# Patient Record
Sex: FEMALE | Race: WHITE | NOT HISPANIC OR LATINO | Employment: FULL TIME | RURAL
[De-identification: names, ages, dates, MRNs, and addresses within clinical notes are randomized per-mention and may not be internally consistent; named-entity substitution may affect disease eponyms.]

---

## 2020-06-29 ENCOUNTER — HISTORICAL (OUTPATIENT)
Dept: ADMINISTRATIVE | Facility: HOSPITAL | Age: 65
End: 2020-06-29

## 2020-06-29 LAB
25(OH)D3 SERPL-MCNC: 26.6 NG/ML
BASOPHILS # BLD AUTO: 0.05 X10E3/UL (ref 0–0.2)
BASOPHILS NFR BLD AUTO: 0.9 % (ref 0–1)
EOSINOPHIL # BLD AUTO: 0.21 X10E3/UL (ref 0–0.5)
EOSINOPHIL NFR BLD AUTO: 3.7 % (ref 1–4)
ERYTHROCYTE [DISTWIDTH] IN BLOOD BY AUTOMATED COUNT: 13.6 % (ref 11.5–14.5)
HCT VFR BLD AUTO: 45 % (ref 38–47)
HGB BLD-MCNC: 13.6 G/DL (ref 12–16)
IMM GRANULOCYTES # BLD AUTO: 0.01 X10E3/UL (ref 0–0.04)
IMM GRANULOCYTES NFR BLD: 0.2 % (ref 0–0.4)
LYMPHOCYTES # BLD AUTO: 1.76 X10E3/UL (ref 1–4.8)
LYMPHOCYTES NFR BLD AUTO: 30.6 % (ref 27–41)
MCH RBC QN AUTO: 28.4 PG (ref 27–31)
MCHC RBC AUTO-ENTMCNC: 30.2 G/DL (ref 32–36)
MCV RBC AUTO: 93.9 FL (ref 80–96)
MONOCYTES # BLD AUTO: 0.44 X10E3/UL (ref 0–0.8)
MONOCYTES NFR BLD AUTO: 7.7 % (ref 2–6)
MPC BLD CALC-MCNC: 11.3 FL (ref 9.4–12.4)
NEUTROPHILS # BLD AUTO: 3.28 X10E3/UL (ref 1.8–7.7)
NEUTROPHILS NFR BLD AUTO: 56.9 % (ref 53–65)
NRBC # BLD AUTO: 0 X10E3/UL (ref 0–0)
NRBC, AUTO (.00): 0 /100 (ref 0–0)
PLATELET # BLD AUTO: 346 X10E3/UL (ref 150–400)
RBC # BLD AUTO: 4.79 X10E6/UL (ref 4.2–5.4)
TSH SERPL DL<=0.005 MIU/L-ACNC: 3.54 UIU/ML (ref 0.36–3.74)
WBC # BLD AUTO: 5.75 X10E3/UL (ref 4.5–11)

## 2020-06-30 ENCOUNTER — HISTORICAL (OUTPATIENT)
Dept: ADMINISTRATIVE | Facility: HOSPITAL | Age: 65
End: 2020-06-30

## 2020-06-30 LAB
CHOLEST SERPL-MCNC: 260 MG/DL
CHOLEST/HDLC SERPL: 4.1 {RATIO}
EST. AVERAGE GLUCOSE BLD GHB EST-MCNC: 110 MG/DL
HBA1C MFR BLD HPLC: 5.9 %
HDLC SERPL-MCNC: 63 MG/DL
LDLC SERPL CALC-MCNC: 165 MG/DL
TRIGL SERPL-MCNC: 160 MG/DL

## 2020-07-02 LAB — TTG IGA SER IA-ACNC: <1.2 U/ML

## 2020-08-24 ENCOUNTER — HISTORICAL (OUTPATIENT)
Dept: ADMINISTRATIVE | Facility: HOSPITAL | Age: 65
End: 2020-08-24

## 2020-08-24 LAB
25(OH)D3 SERPL-MCNC: 48 NG/ML
ALBUMIN SERPL BCP-MCNC: 4.3 G/DL (ref 3.5–5)
ALBUMIN/GLOB SERPL: 1.1 {RATIO}
ALP SERPL-CCNC: 66 U/L (ref 50–130)
ALT SERPL W P-5'-P-CCNC: 18 U/L (ref 13–56)
ANION GAP SERPL CALCULATED.3IONS-SCNC: 11.3 MMOL/L (ref 7–16)
AST SERPL W P-5'-P-CCNC: 17 U/L (ref 15–37)
BILIRUB SERPL-MCNC: 0.3 MG/DL (ref 0–1.2)
BUN SERPL-MCNC: 15 MG/DL (ref 7–18)
BUN/CREAT SERPL: 16
CALCIUM SERPL-MCNC: 9.8 MG/DL (ref 8.5–10.1)
CHLORIDE SERPL-SCNC: 108 MMOL/L (ref 98–107)
CHOLEST SERPL-MCNC: 243 MG/DL
CO2 SERPL-SCNC: 28 MMOL/L (ref 21–32)
CREAT SERPL-MCNC: 0.95 MG/DL (ref 0.5–1.02)
GLOBULIN SER-MCNC: 3.8 G/DL (ref 2–4)
GLUCOSE SERPL-MCNC: 123 MG/DL (ref 74–106)
HDLC SERPL-MCNC: 73 MG/DL
LDLC SERPL CALC-MCNC: 131 MG/DL
NONHDLC SERPL-MCNC: 170 MG/DL
POTASSIUM SERPL-SCNC: 4.3 MMOL/L (ref 3.5–5.1)
PROT SERPL-MCNC: 8.1 G/DL (ref 6.4–8.2)
SODIUM SERPL-SCNC: 143 MMOL/L (ref 136–145)
TRIGL SERPL-MCNC: 196 MG/DL
VLDLC SERPL-MCNC: 39 MG/DL

## 2020-11-20 ENCOUNTER — HISTORICAL (OUTPATIENT)
Dept: ADMINISTRATIVE | Facility: HOSPITAL | Age: 65
End: 2020-11-20

## 2020-11-20 LAB
25(OH)D3 SERPL-MCNC: 32.3 NG/ML
ALBUMIN SERPL BCP-MCNC: 3.9 G/DL (ref 3.5–5)
ALBUMIN/GLOB SERPL: 1 {RATIO}
ALP SERPL-CCNC: 65 U/L (ref 50–130)
ALT SERPL W P-5'-P-CCNC: 16 U/L (ref 13–56)
ANION GAP SERPL CALCULATED.3IONS-SCNC: 8 MMOL/L (ref 7–16)
AST SERPL W P-5'-P-CCNC: 19 U/L (ref 15–37)
BILIRUB SERPL-MCNC: 0.3 MG/DL (ref 0–1.2)
BUN SERPL-MCNC: 16 MG/DL (ref 7–18)
BUN/CREAT SERPL: 16
CALCIUM SERPL-MCNC: 9.1 MG/DL (ref 8.5–10.1)
CHLORIDE SERPL-SCNC: 109 MMOL/L (ref 98–107)
CHOLEST SERPL-MCNC: 246 MG/DL
CHOLEST/HDLC SERPL: 3.6 {RATIO}
CO2 SERPL-SCNC: 29 MMOL/L (ref 21–32)
CREAT SERPL-MCNC: 1 MG/DL (ref 0.5–1.02)
EST. AVERAGE GLUCOSE BLD GHB EST-MCNC: 107 MG/DL
GLOBULIN SER-MCNC: 3.9 G/DL (ref 2–4)
GLUCOSE SERPL-MCNC: 169 MG/DL (ref 74–106)
HBA1C MFR BLD HPLC: 5.8 %
HDLC SERPL-MCNC: 69 MG/DL
LDLC SERPL CALC-MCNC: 129 MG/DL
POTASSIUM SERPL-SCNC: 3.8 MMOL/L (ref 3.5–5.1)
PROT SERPL-MCNC: 7.8 G/DL (ref 6.4–8.2)
SODIUM SERPL-SCNC: 142 MMOL/L (ref 136–145)
TRIGL SERPL-MCNC: 239 MG/DL
TSH SERPL DL<=0.005 MIU/L-ACNC: 1.98 UIU/ML (ref 0.36–3.74)

## 2021-07-01 DIAGNOSIS — M54.9 DORSALGIA, UNSPECIFIED: ICD-10-CM

## 2023-05-25 ENCOUNTER — HOSPITAL ENCOUNTER (OUTPATIENT)
Dept: RADIOLOGY | Facility: HOSPITAL | Age: 68
Discharge: HOME | End: 2023-05-25
Attending: NURSE PRACTITIONER
Payer: COMMERCIAL

## 2023-05-25 DIAGNOSIS — E07.9 DISEASE OF THYROID GLAND: ICD-10-CM

## 2023-05-25 PROCEDURE — 76536 US THYROID: ICD-10-PCS | Mod: 26,,, | Performed by: RADIOLOGY

## 2023-05-25 PROCEDURE — 76536 US EXAM OF HEAD AND NECK: CPT | Mod: TC

## 2023-05-25 PROCEDURE — 76536 US EXAM OF HEAD AND NECK: CPT | Mod: 26,,, | Performed by: RADIOLOGY

## 2023-05-31 ENCOUNTER — OFFICE VISIT (OUTPATIENT)
Dept: FAMILY MEDICINE | Facility: CLINIC | Age: 68
End: 2023-05-31
Payer: COMMERCIAL

## 2023-05-31 VITALS
TEMPERATURE: 98 F | HEART RATE: 66 BPM | OXYGEN SATURATION: 97 % | SYSTOLIC BLOOD PRESSURE: 124 MMHG | BODY MASS INDEX: 35.26 KG/M2 | RESPIRATION RATE: 18 BRPM | DIASTOLIC BLOOD PRESSURE: 82 MMHG | WEIGHT: 168 LBS | HEIGHT: 58 IN

## 2023-05-31 DIAGNOSIS — E04.2 MULTIPLE THYROID NODULES: Primary | ICD-10-CM

## 2023-05-31 PROCEDURE — 1159F PR MEDICATION LIST DOCUMENTED IN MEDICAL RECORD: ICD-10-PCS | Mod: ,,, | Performed by: FAMILY MEDICINE

## 2023-05-31 PROCEDURE — 99202 PR OFFICE/OUTPT VISIT, NEW, LEVL II, 15-29 MIN: ICD-10-PCS | Mod: ,,, | Performed by: FAMILY MEDICINE

## 2023-05-31 PROCEDURE — 3008F PR BODY MASS INDEX (BMI) DOCUMENTED: ICD-10-PCS | Mod: ,,, | Performed by: FAMILY MEDICINE

## 2023-05-31 PROCEDURE — 99202 OFFICE O/P NEW SF 15 MIN: CPT | Mod: ,,, | Performed by: FAMILY MEDICINE

## 2023-05-31 PROCEDURE — 3288F PR FALLS RISK ASSESSMENT DOCUMENTED: ICD-10-PCS | Mod: ,,, | Performed by: FAMILY MEDICINE

## 2023-05-31 PROCEDURE — 1159F MED LIST DOCD IN RCRD: CPT | Mod: ,,, | Performed by: FAMILY MEDICINE

## 2023-05-31 PROCEDURE — 3074F PR MOST RECENT SYSTOLIC BLOOD PRESSURE < 130 MM HG: ICD-10-PCS | Mod: ,,, | Performed by: FAMILY MEDICINE

## 2023-05-31 PROCEDURE — 3288F FALL RISK ASSESSMENT DOCD: CPT | Mod: ,,, | Performed by: FAMILY MEDICINE

## 2023-05-31 PROCEDURE — 1160F RVW MEDS BY RX/DR IN RCRD: CPT | Mod: ,,, | Performed by: FAMILY MEDICINE

## 2023-05-31 PROCEDURE — 1101F PR PT FALLS ASSESS DOC 0-1 FALLS W/OUT INJ PAST YR: ICD-10-PCS | Mod: ,,, | Performed by: FAMILY MEDICINE

## 2023-05-31 PROCEDURE — 3008F BODY MASS INDEX DOCD: CPT | Mod: ,,, | Performed by: FAMILY MEDICINE

## 2023-05-31 PROCEDURE — 3079F PR MOST RECENT DIASTOLIC BLOOD PRESSURE 80-89 MM HG: ICD-10-PCS | Mod: ,,, | Performed by: FAMILY MEDICINE

## 2023-05-31 PROCEDURE — 3074F SYST BP LT 130 MM HG: CPT | Mod: ,,, | Performed by: FAMILY MEDICINE

## 2023-05-31 PROCEDURE — 1160F PR REVIEW ALL MEDS BY PRESCRIBER/CLIN PHARMACIST DOCUMENTED: ICD-10-PCS | Mod: ,,, | Performed by: FAMILY MEDICINE

## 2023-05-31 PROCEDURE — 1101F PT FALLS ASSESS-DOCD LE1/YR: CPT | Mod: ,,, | Performed by: FAMILY MEDICINE

## 2023-05-31 PROCEDURE — 3079F DIAST BP 80-89 MM HG: CPT | Mod: ,,, | Performed by: FAMILY MEDICINE

## 2023-05-31 RX ORDER — SERTRALINE HYDROCHLORIDE 100 MG/1
100 TABLET, FILM COATED ORAL DAILY
COMMUNITY

## 2023-05-31 RX ORDER — DILTIAZEM HYDROCHLORIDE 120 MG/1
180 CAPSULE, COATED, EXTENDED RELEASE ORAL DAILY
COMMUNITY
Start: 2023-04-11

## 2023-05-31 RX ORDER — LEVOTHYROXINE SODIUM 25 UG/1
25 TABLET ORAL
COMMUNITY

## 2023-05-31 RX ORDER — TRAZODONE HYDROCHLORIDE 150 MG/1
150 TABLET ORAL NIGHTLY PRN
COMMUNITY
Start: 2023-05-08

## 2023-05-31 RX ORDER — PRAZOSIN HYDROCHLORIDE 1 MG/1
1 CAPSULE ORAL NIGHTLY
COMMUNITY

## 2023-05-31 RX ORDER — ALPRAZOLAM 0.5 MG/1
0.5 TABLET ORAL
COMMUNITY
Start: 2023-05-08

## 2023-06-02 NOTE — PROGRESS NOTES
Sheila Ortiz is a 67 y.o. female seen today for follow-up on her thyroid.  Patient does have a history of multiple thyroid nodules and we discussed general surgery evaluation.    Past Medical History:   Diagnosis Date    Diabetes mellitus, type 2     Hypothyroidism      No family history on file.  Current Outpatient Medications on File Prior to Visit   Medication Sig Dispense Refill    ALPRAZolam (XANAX) 0.5 MG tablet Take 0.5 mg by mouth as needed.      diltiaZEM (CARDIZEM CD) 120 MG Cp24 Take 120 mg by mouth once daily. At night      levothyroxine (SYNTHROID) 25 MCG tablet Take 25 mcg by mouth before breakfast.      prazosin (MINIPRESS) 1 MG Cap Take 1 mg by mouth every evening.      sertraline (ZOLOFT) 100 MG tablet Take 100 mg by mouth once daily.      traZODone (DESYREL) 150 MG tablet Take 150 mg by mouth nightly as needed.       No current facility-administered medications on file prior to visit.       There is no immunization history on file for this patient.    Review of Systems   Constitutional:  Positive for malaise/fatigue. Negative for fever and weight loss.   Respiratory:  Negative for shortness of breath.    Cardiovascular:  Negative for chest pain and palpitations.   Gastrointestinal:  Negative for nausea and vomiting.   Musculoskeletal:  Positive for joint pain and myalgias.   Psychiatric/Behavioral:  Negative for depression.       Vitals:    05/31/23 0850   BP: 124/82   Pulse: 66   Resp: 18   Temp: 98.1 °F (36.7 °C)       Physical Exam  Vitals reviewed.   Constitutional:       Appearance: Normal appearance.   HENT:      Head: Normocephalic.   Eyes:      Extraocular Movements: Extraocular movements intact.      Conjunctiva/sclera: Conjunctivae normal.      Pupils: Pupils are equal, round, and reactive to light.   Neck:      Thyroid: Thyromegaly present. No thyroid mass.   Cardiovascular:      Rate and Rhythm: Normal rate and regular rhythm.      Heart sounds: Normal heart sounds. No murmur heard.    No  gallop.   Pulmonary:      Effort: Pulmonary effort is normal. No respiratory distress.      Breath sounds: Normal breath sounds. No wheezing or rales.   Skin:     General: Skin is warm and dry.      Coloration: Skin is not jaundiced or pale.   Neurological:      Mental Status: She is alert.   Psychiatric:         Mood and Affect: Mood normal.         Behavior: Behavior normal.         Thought Content: Thought content normal.         Judgment: Judgment normal.        Assessment and Plan  Multiple thyroid nodules  -     Ambulatory referral/consult to General Surgery; Future; Expected date: 06/07/2023            Return to clinic in 1 month or as needed after her general surgery evaluation.    Health Maintenance Topics with due status: Not Due       Topic Last Completion Date    Lipid Panel 08/24/2020    Hemoglobin A1c (Diabetic Prevention Screening) 11/20/2020    Influenza Vaccine Not Due

## 2023-08-01 DIAGNOSIS — R41.3 SHORT-TERM MEMORY LOSS: Primary | ICD-10-CM

## 2023-09-14 ENCOUNTER — OFFICE VISIT (OUTPATIENT)
Dept: NEUROLOGY | Facility: CLINIC | Age: 68
End: 2023-09-14
Payer: COMMERCIAL

## 2023-09-14 VITALS
DIASTOLIC BLOOD PRESSURE: 80 MMHG | BODY MASS INDEX: 36.11 KG/M2 | WEIGHT: 172 LBS | SYSTOLIC BLOOD PRESSURE: 112 MMHG | HEART RATE: 69 BPM | HEIGHT: 58 IN | OXYGEN SATURATION: 97 %

## 2023-09-14 DIAGNOSIS — E55.9 VITAMIN D DEFICIENCY: ICD-10-CM

## 2023-09-14 DIAGNOSIS — E03.9 HYPOTHYROIDISM, UNSPECIFIED TYPE: ICD-10-CM

## 2023-09-14 DIAGNOSIS — R41.3 MEMORY IMPAIRMENT: Primary | ICD-10-CM

## 2023-09-14 DIAGNOSIS — E53.8 VITAMIN B12 DEFICIENCY: ICD-10-CM

## 2023-09-14 LAB
BILIRUB UR QL STRIP: NEGATIVE
CLARITY UR: CLEAR
COLOR UR: ABNORMAL
GLUCOSE UR STRIP-MCNC: NORMAL MG/DL
KETONES UR STRIP-SCNC: NEGATIVE MG/DL
LEUKOCYTE ESTERASE UR QL STRIP: ABNORMAL
MUCOUS, UA: ABNORMAL /LPF
NITRITE UR QL STRIP: NEGATIVE
PH UR STRIP: 5 PH UNITS
PROT UR QL STRIP: NEGATIVE
RBC # UR STRIP: ABNORMAL /UL
RBC #/AREA URNS HPF: 3 /HPF
SP GR UR STRIP: 1.02
SQUAMOUS #/AREA URNS LPF: ABNORMAL /HPF
UROBILINOGEN UR STRIP-ACNC: NORMAL MG/DL
WBC #/AREA URNS HPF: 10 /HPF

## 2023-09-14 PROCEDURE — 99203 PR OFFICE/OUTPT VISIT, NEW, LEVL III, 30-44 MIN: ICD-10-PCS | Mod: S$GLB,,, | Performed by: NURSE PRACTITIONER

## 2023-09-14 PROCEDURE — 1101F PR PT FALLS ASSESS DOC 0-1 FALLS W/OUT INJ PAST YR: ICD-10-PCS | Mod: S$GLB,,, | Performed by: NURSE PRACTITIONER

## 2023-09-14 PROCEDURE — 81001 URINALYSIS AUTO W/SCOPE: CPT | Mod: ,,, | Performed by: CLINICAL MEDICAL LABORATORY

## 2023-09-14 PROCEDURE — 99203 OFFICE O/P NEW LOW 30 MIN: CPT | Mod: S$GLB,,, | Performed by: NURSE PRACTITIONER

## 2023-09-14 PROCEDURE — 3079F DIAST BP 80-89 MM HG: CPT | Mod: S$GLB,,, | Performed by: NURSE PRACTITIONER

## 2023-09-14 PROCEDURE — 1160F PR REVIEW ALL MEDS BY PRESCRIBER/CLIN PHARMACIST DOCUMENTED: ICD-10-PCS | Mod: S$GLB,,, | Performed by: NURSE PRACTITIONER

## 2023-09-14 PROCEDURE — 99215 OFFICE O/P EST HI 40 MIN: CPT | Mod: PBBFAC | Performed by: NURSE PRACTITIONER

## 2023-09-14 PROCEDURE — 3008F BODY MASS INDEX DOCD: CPT | Mod: S$GLB,,, | Performed by: NURSE PRACTITIONER

## 2023-09-14 PROCEDURE — 1101F PT FALLS ASSESS-DOCD LE1/YR: CPT | Mod: S$GLB,,, | Performed by: NURSE PRACTITIONER

## 2023-09-14 PROCEDURE — 3008F PR BODY MASS INDEX (BMI) DOCUMENTED: ICD-10-PCS | Mod: S$GLB,,, | Performed by: NURSE PRACTITIONER

## 2023-09-14 PROCEDURE — 1160F RVW MEDS BY RX/DR IN RCRD: CPT | Mod: S$GLB,,, | Performed by: NURSE PRACTITIONER

## 2023-09-14 PROCEDURE — 3044F PR MOST RECENT HEMOGLOBIN A1C LEVEL <7.0%: ICD-10-PCS | Mod: S$GLB,,, | Performed by: NURSE PRACTITIONER

## 2023-09-14 PROCEDURE — 3044F HG A1C LEVEL LT 7.0%: CPT | Mod: S$GLB,,, | Performed by: NURSE PRACTITIONER

## 2023-09-14 PROCEDURE — 3288F FALL RISK ASSESSMENT DOCD: CPT | Mod: S$GLB,,, | Performed by: NURSE PRACTITIONER

## 2023-09-14 PROCEDURE — 3079F PR MOST RECENT DIASTOLIC BLOOD PRESSURE 80-89 MM HG: ICD-10-PCS | Mod: S$GLB,,, | Performed by: NURSE PRACTITIONER

## 2023-09-14 PROCEDURE — 3074F PR MOST RECENT SYSTOLIC BLOOD PRESSURE < 130 MM HG: ICD-10-PCS | Mod: S$GLB,,, | Performed by: NURSE PRACTITIONER

## 2023-09-14 PROCEDURE — 3074F SYST BP LT 130 MM HG: CPT | Mod: S$GLB,,, | Performed by: NURSE PRACTITIONER

## 2023-09-14 PROCEDURE — 81001 URINALYSIS, REFLEX TO URINE CULTURE: ICD-10-PCS | Mod: ,,, | Performed by: CLINICAL MEDICAL LABORATORY

## 2023-09-14 PROCEDURE — 1126F AMNT PAIN NOTED NONE PRSNT: CPT | Mod: S$GLB,,, | Performed by: NURSE PRACTITIONER

## 2023-09-14 PROCEDURE — 1159F MED LIST DOCD IN RCRD: CPT | Mod: S$GLB,,, | Performed by: NURSE PRACTITIONER

## 2023-09-14 PROCEDURE — 1126F PR PAIN SEVERITY QUANTIFIED, NO PAIN PRESENT: ICD-10-PCS | Mod: S$GLB,,, | Performed by: NURSE PRACTITIONER

## 2023-09-14 PROCEDURE — 1159F PR MEDICATION LIST DOCUMENTED IN MEDICAL RECORD: ICD-10-PCS | Mod: S$GLB,,, | Performed by: NURSE PRACTITIONER

## 2023-09-14 PROCEDURE — 3288F PR FALLS RISK ASSESSMENT DOCUMENTED: ICD-10-PCS | Mod: S$GLB,,, | Performed by: NURSE PRACTITIONER

## 2023-09-14 RX ORDER — FUROSEMIDE 20 MG/1
20 TABLET ORAL DAILY PRN
COMMUNITY
Start: 2023-07-06

## 2023-09-14 RX ORDER — ROSUVASTATIN CALCIUM 10 MG/1
10 TABLET, COATED ORAL
COMMUNITY
Start: 2023-07-10

## 2023-09-14 NOTE — PATIENT INSTRUCTIONS
MRI brain  Dementia panel labs  Do not recommend medications at this time pending further evaluation  Good eating and sleep habits encouraged  Schedule neuropsychological testing with Dr. Hendrix at [facility]

## 2023-09-14 NOTE — PROGRESS NOTES
Subjective:       Patient ID: Sheila Ortiz is a 68 y.o. female     Chief Complaint:  No chief complaint on file.       Allergies:  Cephalexin, Clarithromycin, Codeine phosphate, Codeine sulfate, Penicillins, Sulfa (sulfonamide antibiotics), Sulfamethoxazole-trimethoprim, and Triple antibiotic (colistimth)    Current Medications:    Outpatient Encounter Medications as of 9/14/2023   Medication Sig Dispense Refill    ALPRAZolam (XANAX) 0.5 MG tablet Take 0.5 mg by mouth as needed.      diltiaZEM (CARDIZEM CD) 120 MG Cp24 Take 180 mg by mouth once daily. At night      furosemide (LASIX) 20 MG tablet Take 20 mg by mouth daily as needed.      levothyroxine (SYNTHROID) 25 MCG tablet Take 25 mcg by mouth before breakfast.      prazosin (MINIPRESS) 1 MG Cap Take 1 mg by mouth every evening.      rosuvastatin (CRESTOR) 10 MG tablet Take 10 mg by mouth.      sertraline (ZOLOFT) 100 MG tablet Take 100 mg by mouth once daily.      traZODone (DESYREL) 150 MG tablet Take 150 mg by mouth nightly as needed.       No facility-administered encounter medications on file as of 9/14/2023.       History of Present Illness  67 y/o female new referral to neurology for reported memory impairment    Mother had dementia, thus patient has concern about this in her family history    Patient does have history of anxiety and depression prescribed xanax to take PRN Bid as well as trazodone and zoloft.  Trazodone for insomnia.  She is college educated, actually teaches online psychiatry.  She is having more difficulty focusing on research which is quite upsetting to her.    She denies sleep apnea, had prior sleep study in 2012 and negative.  Denies classic sleep apnea symptoms.    She has complaints of losing train of thought easily, word searching.  She denies complications with ADLs, denies getting lost driving to familiar locations.           Review of Systems  Review of Systems   Constitutional:  Negative for diaphoresis and fever.   HENT:   Negative for congestion, hearing loss and tinnitus.    Eyes:  Negative for blurred vision, double vision, photophobia, discharge and redness.   Respiratory:  Negative for cough and shortness of breath.    Cardiovascular:  Negative for chest pain.   Gastrointestinal:  Negative for abdominal pain, nausea and vomiting.   Musculoskeletal:  Negative for back pain, joint pain, myalgias and neck pain.   Skin:  Negative for itching and rash.   Neurological:  Positive for dizziness and headaches. Negative for tremors, sensory change, speech change, focal weakness, seizures, loss of consciousness and weakness.   Psychiatric/Behavioral:  Positive for depression and memory loss. Negative for hallucinations. The patient is nervous/anxious. The patient does not have insomnia.    All other systems reviewed and are negative.     Objective:     NEUROLOGICAL EXAMINATION:     MENTAL STATUS   Oriented to person, place, and time.   Registration: recalls 3 of 3 objects. Recall at 5 minutes: recalls 3 of 3 objects.   Attention: normal. Concentration: normal.   Speech: speech is normal   Level of consciousness: alert  Knowledge: good and consistent with education.   Normal comprehension.     CRANIAL NERVES     CN II   Visual fields full to confrontation.   Visual acuity: normal  Right visual field deficit: none  Left visual field deficit: none     CN III, IV, VI   Pupils are equal, round, and reactive to light.  Extraocular motions are normal.   Right pupil: Size: 3 mm. Shape: regular. Reactivity: brisk. Consensual response: intact. Accommodation: intact.   Left pupil: Size: 3 mm. Shape: regular. Reactivity: brisk. Consensual response: intact. Accommodation: intact.   CN III: no CN III palsy  CN VI: no CN VI palsy  Nystagmus: none   Diplopia: none  Upgaze: normal  Downgaze: normal  Conjugate gaze: present  Vestibulo-ocular reflex: present    CN V   Facial sensation intact.   Right facial sensation deficit: none  Left facial sensation  deficit: none  Right corneal reflex: normal  Left corneal reflex: normal    CN VII   Facial expression full, symmetric.   Right facial weakness: none  Left facial weakness: none  Right taste: normal  Left taste: normal    CN VIII   CN VIII normal.   Hearing: intact    CN IX, X   CN IX normal.   CN X normal.   Palate: symmetric    CN XI   CN XI normal.   Right sternocleidomastoid strength: normal  Left sternocleidomastoid strength: normal  Right trapezius strength: normal  Left trapezius strength: normal    CN XII   CN XII normal.   Tongue: not atrophic  Fasciculations: absent  Tongue deviation: none    MOTOR EXAM   Muscle bulk: normal  Overall muscle tone: normal  Right arm tone: normal  Left arm tone: normal  Right arm pronator drift: absent  Left arm pronator drift: absent  Right leg tone: normal  Left leg tone: normal    Strength   Right neck flexion: 5/5  Left neck flexion: 5/5  Right neck extension: 5/5  Left neck extension: 5/5  Right deltoid: 5/5  Left deltoid: 5/5  Right biceps: 5/5  Left biceps: 5/5  Right triceps: 5/5  Left triceps: 5/5  Right wrist flexion: 5/5  Left wrist flexion: 5/5  Right wrist extension: 5/5  Left wrist extension: 5/5  Right interossei: 5/5  Left interossei: 5/5  Right iliopsoas: 5/5  Left iliopsoas: 5/5  Right quadriceps: 5/5  Left quadriceps: 5/5  Right hamstrin/5  Left hamstrin/5  Right anterior tibial: 5/5  Left anterior tibial: 5/5  Right posterior tibial: 5/5  Left posterior tibial: 5/5  Right gastroc: 5/5  Left gastroc: 5/5    REFLEXES     Reflexes   Right brachioradialis: 2+  Left brachioradialis: 2+  Right biceps: 2+  Left biceps: 2+  Right triceps: 2+  Left triceps: 2+  Right patellar: 2+  Left patellar: 2+  Right achilles: 2+  Left achilles: 2+  Right plantar: normal  Left plantar: normal  Right Spears: absent  Left Spears: absent  Right ankle clonus: absent  Left ankle clonus: absent  Right pendular knee jerk: absent  Left pendular knee jerk: absent    SENSORY  EXAM   Light touch normal.   Right arm light touch: normal  Left arm light touch: normal  Right leg light touch: normal  Left leg light touch: normal  Vibration normal.   Right arm vibration: normal  Left arm vibration: normal  Right leg vibration: normal  Left leg vibration: normal  Proprioception normal.   Right arm proprioception: normal  Left arm proprioception: normal  Right leg proprioception: normal  Left leg proprioception: normal  Pinprick normal.   Right arm pinprick: normal  Left arm pinprick: normal  Right leg pinprick: normal  Left leg pinprick: normal  Graphesthesia: normal  Romberg: negative  Stereognosis: normal    GAIT AND COORDINATION     Gait  Gait: normal     Coordination   Finger to nose coordination: normal  Heel to shin coordination: normal  Tandem walking coordination: normal    Tremor   Resting tremor: absent  Intention tremor: absent  Action tremor: absent       Physical Exam  Vitals and nursing note reviewed.   Constitutional:       Appearance: Normal appearance.   HENT:      Head: Normocephalic.   Eyes:      Extraocular Movements: Extraocular movements intact and EOM normal.      Pupils: Pupils are equal, round, and reactive to light.   Cardiovascular:      Rate and Rhythm: Normal rate and regular rhythm.   Pulmonary:      Effort: Pulmonary effort is normal.      Breath sounds: Normal breath sounds.   Musculoskeletal:         General: No swelling or tenderness. Normal range of motion.      Cervical back: Normal range of motion and neck supple.      Right lower leg: No edema.      Left lower leg: No edema.   Skin:     General: Skin is warm and dry.      Coloration: Skin is not jaundiced.      Findings: No rash.   Neurological:      General: No focal deficit present.      Mental Status: She is alert and oriented to person, place, and time.      GCS: GCS eye subscore is 4. GCS verbal subscore is 5. GCS motor subscore is 6.      Cranial Nerves: No cranial nerve deficit.      Sensory: No  sensory deficit.      Motor: Motor function is intact. No weakness.      Coordination: Coordination is intact. Coordination normal. Finger-Nose-Finger Test, Heel to Shin Test and Romberg Test normal.      Gait: Gait is intact. Gait and tandem walk normal.      Deep Tendon Reflexes: Reflexes normal.      Reflex Scores:       Tricep reflexes are 2+ on the right side and 2+ on the left side.       Bicep reflexes are 2+ on the right side and 2+ on the left side.       Brachioradialis reflexes are 2+ on the right side and 2+ on the left side.       Patellar reflexes are 2+ on the right side and 2+ on the left side.       Achilles reflexes are 2+ on the right side and 2+ on the left side.  Psychiatric:         Mood and Affect: Mood normal.         Speech: Speech normal.         Behavior: Behavior normal.          Assessment:     Problem List Items Addressed This Visit    None  Visit Diagnoses       Memory impairment    -  Primary    Relevant Orders    Ammonia    CBC Auto Differential    Comprehensive Metabolic Panel    Urinalysis, Reflex to Urine Culture    Ambulatory referral/consult to Adult Neuropsychology    Vitamin B12 deficiency        Relevant Orders    Folate    Vitamin B12    Vitamin D deficiency        Relevant Orders    Vitamin D    Hypothyroidism, unspecified type        Relevant Orders    TSH             Primary Diagnosis and ICD10  Memory impairment [R41.3]    Plan:     Patient Instructions   MRI brain  Dementia panel labs  Do not recommend medications at this time pending further evaluation  Good eating and sleep habits encouraged  Schedule neuropsychological testing with Dr. Hendrix at [facility]    There are no discontinued medications.    Requested Prescriptions      No prescriptions requested or ordered in this encounter       Orders Placed This Encounter   Procedures    Ammonia    CBC Auto Differential    Comprehensive Metabolic Panel    Folate    Vitamin D    Vitamin B12    TSH    Urinalysis,  Reflex to Urine Culture    Ambulatory referral/consult to Adult Neuropsychology

## 2023-10-13 ENCOUNTER — TELEPHONE (OUTPATIENT)
Dept: NEUROLOGY | Facility: CLINIC | Age: 68
End: 2023-10-13
Payer: COMMERCIAL

## 2023-10-13 NOTE — TELEPHONE ENCOUNTER
Called Patient twice  VM Full.      ----- Message from KENISHA Malik sent at 9/15/2023  6:25 AM CDT -----  Her labs are not bad at all, her vitamin D is a little low, I suggest she take vitamin D 2000 units over the counter daily

## 2023-11-18 ENCOUNTER — PATIENT MESSAGE (OUTPATIENT)
Dept: ADMINISTRATIVE | Facility: HOSPITAL | Age: 68
End: 2023-11-18

## 2023-11-19 DIAGNOSIS — Z12.11 SCREENING FOR COLON CANCER: ICD-10-CM

## 2024-07-02 ENCOUNTER — TELEPHONE (OUTPATIENT)
Dept: FAMILY MEDICINE | Facility: CLINIC | Age: 69
End: 2024-07-02
Payer: COMMERCIAL

## 2024-07-16 ENCOUNTER — OFFICE VISIT (OUTPATIENT)
Dept: FAMILY MEDICINE | Facility: CLINIC | Age: 69
End: 2024-07-16
Payer: COMMERCIAL

## 2024-07-16 ENCOUNTER — TELEPHONE (OUTPATIENT)
Dept: FAMILY MEDICINE | Facility: CLINIC | Age: 69
End: 2024-07-16
Payer: COMMERCIAL

## 2024-07-16 VITALS
DIASTOLIC BLOOD PRESSURE: 82 MMHG | HEIGHT: 58 IN | TEMPERATURE: 98 F | WEIGHT: 157.63 LBS | BODY MASS INDEX: 33.09 KG/M2 | SYSTOLIC BLOOD PRESSURE: 126 MMHG | OXYGEN SATURATION: 98 % | RESPIRATION RATE: 20 BRPM | HEART RATE: 98 BPM

## 2024-07-16 DIAGNOSIS — M54.50 ACUTE BILATERAL LOW BACK PAIN, UNSPECIFIED WHETHER SCIATICA PRESENT: ICD-10-CM

## 2024-07-16 DIAGNOSIS — M54.2 NECK PAIN: Primary | ICD-10-CM

## 2024-07-16 PROCEDURE — 1160F RVW MEDS BY RX/DR IN RCRD: CPT | Mod: ,,, | Performed by: FAMILY MEDICINE

## 2024-07-16 PROCEDURE — 1125F AMNT PAIN NOTED PAIN PRSNT: CPT | Mod: ,,, | Performed by: FAMILY MEDICINE

## 2024-07-16 PROCEDURE — 1159F MED LIST DOCD IN RCRD: CPT | Mod: ,,, | Performed by: FAMILY MEDICINE

## 2024-07-16 PROCEDURE — 3288F FALL RISK ASSESSMENT DOCD: CPT | Mod: ,,, | Performed by: FAMILY MEDICINE

## 2024-07-16 PROCEDURE — 1101F PT FALLS ASSESS-DOCD LE1/YR: CPT | Mod: ,,, | Performed by: FAMILY MEDICINE

## 2024-07-16 PROCEDURE — 3008F BODY MASS INDEX DOCD: CPT | Mod: ,,, | Performed by: FAMILY MEDICINE

## 2024-07-16 PROCEDURE — 99213 OFFICE O/P EST LOW 20 MIN: CPT | Mod: ,,, | Performed by: FAMILY MEDICINE

## 2024-07-16 PROCEDURE — 3044F HG A1C LEVEL LT 7.0%: CPT | Mod: ,,, | Performed by: FAMILY MEDICINE

## 2024-07-16 PROCEDURE — 3074F SYST BP LT 130 MM HG: CPT | Mod: ,,, | Performed by: FAMILY MEDICINE

## 2024-07-16 PROCEDURE — 3079F DIAST BP 80-89 MM HG: CPT | Mod: ,,, | Performed by: FAMILY MEDICINE

## 2024-07-16 RX ORDER — DEXTROAMPHETAMINE SACCHARATE, AMPHETAMINE ASPARTATE, DEXTROAMPHETAMINE SULFATE AND AMPHETAMINE SULFATE 2.5; 2.5; 2.5; 2.5 MG/1; MG/1; MG/1; MG/1
1 TABLET ORAL EVERY MORNING
COMMUNITY
Start: 2024-06-27

## 2024-07-16 RX ORDER — DONEPEZIL HYDROCHLORIDE 10 MG/1
10 TABLET, FILM COATED ORAL EVERY MORNING
COMMUNITY
Start: 2024-04-24

## 2024-07-16 RX ORDER — HYDROCHLOROTHIAZIDE 25 MG/1
25 TABLET ORAL DAILY
COMMUNITY
Start: 2024-05-10

## 2024-07-16 RX ORDER — CYCLOBENZAPRINE HCL 10 MG
10 TABLET ORAL DAILY
COMMUNITY

## 2024-07-16 RX ORDER — DEXTROAMPHETAMINE SACCHARATE, AMPHETAMINE ASPARTATE, DEXTROAMPHETAMINE SULFATE AND AMPHETAMINE SULFATE 5; 5; 5; 5 MG/1; MG/1; MG/1; MG/1
1 TABLET ORAL DAILY
COMMUNITY
Start: 2024-07-11

## 2024-07-16 RX ORDER — METHYLPREDNISOLONE 4 MG/1
TABLET ORAL
Qty: 21 EACH | Refills: 0 | Status: SHIPPED | OUTPATIENT
Start: 2024-07-16 | End: 2024-08-06

## 2024-07-16 NOTE — TELEPHONE ENCOUNTER
----- Message from David Tay MD sent at 7/16/2024 12:10 PM CDT -----  Patient has degenerative changes of the lumbar spine and cervical spine as we discussed.

## 2024-07-16 NOTE — PROGRESS NOTES
Sheila Ortiz is a 69 y.o. female seen today for worsening neck thoracic and lumbar back pain.  Symptoms began to worsen after she had a prolonged session of house cleaning.     Past Medical History:   Diagnosis Date    Diabetes mellitus, type 2     Hypothyroidism      No family history on file.  Current Outpatient Medications on File Prior to Visit   Medication Sig Dispense Refill    ALPRAZolam (XANAX) 0.5 MG tablet Take 0.5 mg by mouth as needed.      diltiaZEM (CARDIZEM CD) 120 MG Cp24 Take 120 mg by mouth once daily. At night      levothyroxine (SYNTHROID) 25 MCG tablet Take 25 mcg by mouth before breakfast.      prazosin (MINIPRESS) 1 MG Cap Take 1 mg by mouth every evening.      rosuvastatin (CRESTOR) 10 MG tablet Take 10 mg by mouth.      sertraline (ZOLOFT) 100 MG tablet Take 50 mg by mouth every evening.      traZODone (DESYREL) 150 MG tablet Take 150 mg by mouth nightly as needed.      furosemide (LASIX) 20 MG tablet Take 20 mg by mouth daily as needed. (Patient not taking: Reported on 7/16/2024)       No current facility-administered medications on file prior to visit.       There is no immunization history on file for this patient.    Review of Systems   Constitutional:  Negative for fever, malaise/fatigue and weight loss.   Respiratory:  Negative for shortness of breath.    Cardiovascular:  Negative for chest pain and palpitations.   Gastrointestinal:  Negative for nausea and vomiting.   Musculoskeletal:  Positive for back pain, myalgias and neck pain. Negative for falls.   Psychiatric/Behavioral:  Negative for depression.         Vitals:    07/16/24 1053   BP: 126/82   Pulse: 98   Resp: 20   Temp: 97.6 °F (36.4 °C)       Physical Exam  Vitals reviewed.   Constitutional:       Appearance: Normal appearance.   Musculoskeletal:      Cervical back: Spasms and tenderness present. Decreased range of motion.      Thoracic back: Spasms and tenderness present. Decreased range of motion.      Lumbar back: Spasms  and tenderness present. Decreased range of motion.          Assessment and Plan  1. Neck pain  -     X-Ray Cervical Spine 2 or 3 Views; Future; Expected date: 07/16/2024  -     Ambulatory referral/consult to Physical/Occupational Therapy; Future; Expected date: 07/23/2024  -     methylPREDNISolone (MEDROL DOSEPACK) 4 mg tablet; use as directed  Dispense: 21 each; Refill: 0    2. Acute bilateral low back pain, unspecified whether sciatica present  -     X-Ray Lumbar Spine AP And Lateral; Future; Expected date: 07/16/2024  -     Ambulatory referral/consult to Physical/Occupational Therapy; Future; Expected date: 07/23/2024  -     methylPREDNISolone (MEDROL DOSEPACK) 4 mg tablet; use as directed  Dispense: 21 each; Refill: 0             Return to clinic in 6 weeks or as needed.  X-rays appear to show degenerative changes of the lower lumbar spine and abnormal straightening of the C-spine.    Health Maintenance Topics with due status: Not Due       Topic Last Completion Date    Lipid Panel 11/06/2023    Hemoglobin A1c (Prediabetes) 05/09/2024    Influenza Vaccine Not Due

## 2024-07-24 ENCOUNTER — TELEPHONE (OUTPATIENT)
Dept: FAMILY MEDICINE | Facility: CLINIC | Age: 69
End: 2024-07-24
Payer: COMMERCIAL

## 2024-07-25 ENCOUNTER — CLINICAL SUPPORT (OUTPATIENT)
Dept: REHABILITATION | Facility: HOSPITAL | Age: 69
End: 2024-07-25
Payer: COMMERCIAL

## 2024-07-25 DIAGNOSIS — M54.50 ACUTE BILATERAL LOW BACK PAIN, UNSPECIFIED WHETHER SCIATICA PRESENT: ICD-10-CM

## 2024-07-25 DIAGNOSIS — M54.50 ACUTE BILATERAL LOW BACK PAIN WITHOUT SCIATICA: ICD-10-CM

## 2024-07-25 DIAGNOSIS — M54.2 NECK PAIN: Primary | ICD-10-CM

## 2024-07-25 PROCEDURE — 97162 PT EVAL MOD COMPLEX 30 MIN: CPT

## 2024-07-25 NOTE — PROGRESS NOTES
See Plan of Care     Sup Visit performed today with BRONSON Rothman and BRONSON Bethea.  All goals and treatment plan reviewed. Will work toward completion of all goals set.    Patient is nervous about any kind of aggressive therapy. She reports she has responded well in the past to deep tissue work and massage.   She does have a pacemaker so we will not use  E Stim.     She will be treated for both Neck and Back and we can alternate treatments as needed      Exercises     UBE    Pulleys    Corner Stretch         Cane Flexion on Wall     Cane Flexion off Wall     Cybex Rows - Close     Cybex Rows - Wide            Cervical Active Range of Motion/Resisted    Flexion    Extension    Side Bending Right and Left     Rotation Right and Left     Protraction/Retraction                   Neuro Re-Education         Shoulder Ext/Scap Retraction    Horizontal Abduction    Bilateral External Rotation        Wall Worley

## 2024-07-26 ENCOUNTER — PATIENT MESSAGE (OUTPATIENT)
Dept: FAMILY MEDICINE | Facility: CLINIC | Age: 69
End: 2024-07-26
Payer: COMMERCIAL

## 2024-07-28 PROBLEM — M54.2 NECK PAIN: Status: ACTIVE | Noted: 2024-07-28

## 2024-07-28 PROBLEM — M54.50 ACUTE BILATERAL LOW BACK PAIN WITHOUT SCIATICA: Status: ACTIVE | Noted: 2024-07-28

## 2024-07-29 NOTE — PLAN OF CARE
[facility]   Physical Therapy Initial Evaluation      Name: Sheila Ortiz  Clinic Number: [id number]    Therapy Diagnosis: Neck and Back Pain    Physician: David Tay MD    Physician Orders: PT Eval and Treat   Medical Diagnosis from Referral: Neck and Back Pain   Evaluation Date: 7/25/2024  Authorization Period Expiration: 7/16/2025   Plan of Care Expiration: 9/20/2024   Visit # / Visits authorized: 1/ 17   FOTO: 34/100    Precautions: Pacemaker     Time In: 10:45 am   Time Out: 11:35 am   Total Appointment Time (timed & untimed codes): 50 minutes    Subjective     Date of onset: 6/1/2024    History of current condition - Sheila reports:  Patient saw Dr Tay on 7/16/2024. She reported to him that she has been having neck and back pain that is worsening. X Rays were performed. Cervical X Rays showed degenerative disc changes at C5-6 manifested by anterior osteophytes and generative facet changes from C3 through C6. Lumbar X Rays showed degenerative changes at L3-4, L4-5 and L5-S1, and degenerative facet changes are present from L4 through S1.   Reports cracking and popping in the neck. She has pain in upper neck all the way through the upper traps and between her shoulder blades. Unable to sit in a regular chair for more than a few minutes.   She reports long standing back pain. She fell in 2021 on a wet spot on the floor and this caused neck and back pain. In May of this year she was doing some work in her yard and was picking up bags of mulch and this caused severe back pain that has not gotten much better.  PMH : Takotsubo cardiomyopathy; Pacemaker       Falls: None     Imaging: X Ray :   Lumbar X Ray   FINDINGS:  No malalignment is seen.  There are degenerative changes at L3-4, L4-5 and L5-S1, and degenerative facet changes are present from L4 through S1.  No fracture or subluxation is seen.  SI joints are normal.  Impression:  Degenerative changes are present at multiple  levels in the lower lumbar spine.    Neck X Ray   FINDINGS:  There is straightening of the normal lordotic curve with degenerative disc changes at C5-6 manifested by anterior osteophytes.  Degenerative facet changes are present from C3 through C6.  No fracture or subluxation is seen.  Impression:  Degenerative changes are present, most severe at C5-C6.         Prior Therapy: None   Social History:  lives with their family  Occupation: [employer] - Works from home on the computer   Prior Level of Function: Independent and active   Current Level of Function: Pain in Neck and Back limit her mobility     Pain:  Current 6/10, worst 9/10, best 3/10   Location: Neck, Shoulders, and back    Description: Aching, Dull, and Throbbing; Has some occasional numbness and tingling in the Right Upper Extremity all the way to the fingers  Aggravating Factors: Sitting, Standing, Lifting, and Getting out of bed/chair  Easing Factors: relaxation, pain medication, ice, heating pad, and rest    Patients goals: Wants to be able to work in the yard and be active      Medical History:   Past Medical History:   Diagnosis Date    Diabetes mellitus, type 2     Hypothyroidism        Surgical History:   Sheila Ortiz  has a past surgical history that includes Tubal ligation and  section.    Medications:   Sheila has a current medication list which includes the following prescription(s): alprazolam, cyclobenzaprine, dextroamphetamine-amphetamine, dextroamphetamine-amphetamine, diltiazem, donepezil, furosemide, hydrochlorothiazide, levothyroxine, methylprednisolone, prazosin, rosuvastatin, sertraline, and trazodone.    Allergies:   Review of patient's allergies indicates:   Allergen Reactions    Cephalexin     Clarithromycin     Codeine phosphate     Codeine sulfate     Penicillins     Sulfa (sulfonamide antibiotics)     Sulfamethoxazole-trimethoprim     Triple antibiotic (colistimth)     Erythromycin base Rash        Objective        Posture:  "Rounded shoulders and forward head    Dominant Hand: Right     Reflexes:    Bicep: Normal    Tricep: Normal     Sensation:   RUE: occasional numbness and tingling in Right arm all the way to the fingertips   LUE: Normal     Special Tests   Compression: Negative    Distraction: Negative    Spurling's: Negative           Right Shoulder MMT Left   4/5  Flexion 4/5    4/5  Abd 4/5    4+/5  ER 4+/5    4+/5  IR 4+/5    5/5  Elbow Flex 5/5    5/5  Elbow Ext 5/5         Right  AROM (degs)  Left   40 Cervical Flexion  Normal 45    50 Cervical Extension  Normal 60    30 Lateral bending  Normal 45 30   65 Rotation  Normal 80 65   Full  Shoulder flexion Full    " Shoulder abduction "   " Internal rotation "   " External rotation "          Posture :     Standing Lordosis        []  Increased   [x]   Decreased   []  Within Normal Limits  Sitting Lordosis  []  Increased   [x]   Decreased   []  Within Normal Limits   Iliac Crest Height  []  Left/Right Increased      [x] Equal/Level  PSIS Height    []  Left/Right Increased      [x] Equal/Level  Pelvic Rot/Torsion       []   Yes     []   No  Scoliosis    []  Yes   Concave Right/Left      [x]  No  Lateral Shift    []   Right     []   Left          [x]  Within Normal Limits  Comments         Strength :      Myotome/Muscle :  Left   Right     L1-2    Psoas  4/5  4/5    L3       Quadriceps  5/5  5/5    L4       Anterior Tibialis  5/5  5/5    L5       EHL   5/5  5/5    S1      Gastocnemius  5/5  5/5    S2      Hamstrings  5/5  5/5                    Strength :   Abdominals 3+/5  Back Extensors 3+/5  Multifidus 3+/5      Range of Motion :     Back :    Forward Flexion Full    Back Bending Full    Side Bending Left Functional but painful on the Left    Side Bending Right Functional but painful on the Left    Rotation Left Functional but painful on the Left    Rotation Right Functional but painful on the Left     With mobility pain/pulling felt in the Left shoulder blade and low back "     Hip :   Hamstrings : Normal   Piriformis : Normal       Special Tests :     SLR :   Right   +/- <60 Degrees     []   yes   [x]  No  ;   +/-  60-90 Degrees     []   Yes    [x]  No   Left     +/- <60 Degrees      []  yes    [x] No ;   +/-  60-90 Degrees       []   Yes    [] No    Sitting Slump Test :  Left : [] Positive   [x]  Negative ;  Right : [] Positive   [x]  Negative   Lower Extremity Length :   [] Right/Left Longer     [x]  Within Normal Limits   DEA : Left : [] Positive   [x]  Negative ;  Right : [] Positive   [x]  Negative       SI Joint : Normal     Dermatome : Normal Sensation most of the time      Palpation : Patient has muscle spasms and trigger points in the cervical region, paraspinals throughout the Cervical, thoracic, and lumbar spine. She has trigger points in the scapular region especially on the Left.              Limitation/Restriction for FOTO Intake Survey    Therapist reviewed FOTO scores for Sheila Ortiz on 7/25/2024.   FOTO documents entered into [employer] - see Media section.    Limitation Score: 66%           Patient Education      Education provided:   - Discussed the findings from the Evaluation and Reviewed the Plan of Care.    Assessment     Sheila is a 69 y.o. female referred to outpatient Physical Therapy with a medical diagnosis of Neck and Back Pain. Sheila reports:  Patient saw Dr Tay on 7/16/2024. She reported to him that she has been having neck and back pain that is worsening. X Rays were performed. Cervical X Rays showed degenerative disc changes at C5-6 manifested by anterior osteophytes and generative facet changes from C3 through C6. Lumbar X Rays showed degenerative changes at L3-4, L4-5 and L5-S1, and degenerative facet changes are present from L4 through S1.   Reports cracking and popping in the neck. She has pain in upper neck all the way through the upper traps and between her shoulder blades. Unable to sit in a regular chair for more than a few minutes.   She  reports long standing back pain. She fell in 2021 on a wet spot on the floor and this caused neck and back pain. In May of this year she was doing some work in her yard and was picking up bags of mulch and this caused severe back pain that has not gotten much better.  PMH : Takotsubo cardiomyopathy; Pacemaker   Patient presents with decreased cervical mobility. Her lumbosacral mobility is functional but she has pain/pulling that is felt in the Left shoulder blade and low back during side bending and rotation. She does have some decreased strength in the shoulders and hips. Patient has muscle spasms and trigger points in the cervical region, paraspinals throughout the Cervical, thoracic, and lumbar spine. She has trigger points in the scapular region especially on the Left.   Patient will require Physical Therapy Intervention to address all deficits and work toward completion of all goals set. Therapist will refer patient back to MD upon completion of Therapy for further assessment and possible MRI if pain and dysfunction persist.       Patient prognosis is Good.   Patient will benefit from skilled outpatient Physical Therapy to address the deficits stated above and in the chart below, provide patient /family education, and to maximize patientt's level of independence.     Plan of care discussed with patient: Yes  Patient's spiritual, cultural and educational needs considered and patient is agreeable to the plan of care and goals as stated below:     Anticipated Barriers for therapy: None     Medical Necessity is demonstrated by the following  History  Co-morbidities and personal factors that may impact the plan of care [] LOW: no personal factors / co-morbidities  [] MODERATE: 1-2 personal factors / co-morbidities  [x] HIGH: 3+ personal factors / co-morbidities    Moderate / High Support Documentation:   Co-morbidities affecting plan of care:   Past Medical History:   Diagnosis Date    Diabetes mellitus, type 2      Hypothyroidism    Takotsubo cardiomyopathy  Pacemaker       has a past surgical history that includes Tubal ligation and  section.  Personal Factors:   no deficits     Examination  Body Structures and Functions, activity limitations and participation restrictions that may impact the plan of care [] LOW: addressing 1-2 elements  [x] MODERATE: 3+ elements  [] HIGH: 4+ elements (please support below)    Moderate / High Support Documentation: decreased cervical mobility. Her lumbosacral mobility is functional but she has pain/pulling that is felt in the Left shoulder blade and low back during side bending and rotation. She does have some decreased strength in the shoulders and hips. Patient has muscle spasms and trigger points in the cervical region, paraspinals throughout the Cervical, thoracic, and lumbar spine. She has trigger points in the scapular region especially on the Left.      Clinical Presentation [] LOW: stable  [] MODERATE: Evolving  [] HIGH: Unstable     Decision Making/ Complexity Score: moderate         Goals:  Short Term Goals: 4 weeks   1. Patient will be Independent with Home Exercise Program   2. Patient will demonstrate with improved Posture and Body Mechanics  3. Patient will increase Cervical Range of Motion by 10%   4. Patient will decrease complaints of pain with activity to 5/10     Long Term Goals: 8 weeks   1. Patient will tolerate 30 minutes of activity with complaints of Pain at Less Than or Equal to 3/10       Plan     Plan of care Certification: 2024 to 2024.    Outpatient Physical Therapy 2 times weekly for 8 weeks to include the following interventions: 59545 [therapeutic exercise], 58963 [neuromuscular re-education], 28380 [manual therapy], and 68723 [therapeutic activities]    ADAM GARZA, PT, DPT

## 2024-07-30 ENCOUNTER — CLINICAL SUPPORT (OUTPATIENT)
Dept: REHABILITATION | Facility: HOSPITAL | Age: 69
End: 2024-07-30
Payer: COMMERCIAL

## 2024-07-30 DIAGNOSIS — M54.50 ACUTE BILATERAL LOW BACK PAIN WITHOUT SCIATICA: ICD-10-CM

## 2024-07-30 DIAGNOSIS — M54.2 NECK PAIN: Primary | ICD-10-CM

## 2024-07-30 PROCEDURE — 97110 THERAPEUTIC EXERCISES: CPT | Mod: CQ

## 2024-07-30 PROCEDURE — 97140 MANUAL THERAPY 1/> REGIONS: CPT | Mod: CQ

## 2024-07-30 PROCEDURE — 97112 NEUROMUSCULAR REEDUCATION: CPT | Mod: CQ

## 2024-07-30 NOTE — PROGRESS NOTES
[facility]   Physical Therapy Treatment Note      Name: Sheila Ortiz  Clinic Number: [id number]    Therapy Diagnosis: No diagnosis found.  Physician: David Tay MD    Visit Date: 7/30/2024    Therapy Diagnosis: Neck and Back Pain    Physician: David Tay MD     Physician Orders: PT Eval and Treat   Medical Diagnosis from Referral: Neck and Back Pain   Evaluation Date: 7/25/2024  Authorization Period Expiration: 7/16/2025   Plan of Care Expiration: 9/20/2024   Visit # / Visits authorized: 2 / 17   FOTO: 34/100     Precautions: Pacemaker      Time In: 11:30 am   Time Out: 12:21 pm   Total Appointment Time (timed & untimed codes): 26 billable minutes    PTA Visit #: 1/5       Subjective     Patient reports: stiffness in her neck.  She was issued HEP on 7/30/2024  Response to previous treatment: First since eval  Functional change: none    Pain: 1/10  Location: bilateral neck      Prior Level of Function: Independent and active   Current Level of Function: Pain in Neck and Back limit her mobility     Objective      Objective Measures updated at progress report unless specified.     Treatment     Sheila received the treatments listed below:      therapeutic exercises to develop strength, endurance, ROM, flexibility, posture, and core stabilization for 8 minutes including:  UBE  x 3 mins   Pulleys  X 3 mins   Corner Stretch   4x20 sec   Upper Trap Stretch  3x20 sec (B)   Cane Flexion on Wall      Cane Flexion off Wall      Cybex Rows - Close      Cybex Rows - Wide                 Cervical Active Range of Motion/Resisted     Flexion     Extension     Side Bending Right and Left      Rotation Right and Left      Protraction/Retraction                          manual therapy techniques: Joint mobilizations, Manual traction, and Myofacial release were applied to the: neck for 9 minutes, including:  MFR  Suboccipital Release  UT/SCM Stretch    neuromuscular re-education activities to  improve: Kinesthetic, Proprioception, and Posture for 8 minutes. The following activities were included:        Shoulder Ext/Scap Retraction  Prone 20x5 sec hold   Scap Retraction/Ext with Band  Green 30x   Rows High/Low  3 plates 15x each         Wall Moriarty                   Patient Education and Home Exercises       Education provided:   - None    Written Home Exercises Provided: Patient instructed to cont prior HEP. Exercises were reviewed and Sheila was able to demonstrate them prior to the end of the session.  Sheila demonstrated good  understanding of the education provided. See Electronic Medical Record under Patient Instructions for exercises provided during therapy sessions    Assessment     Pt tolerated session well with decreased pain/stiffness at end of session. Mild UT tightness bilaterally but more on R side. Progressed mobility and stretching today with decreased tension at end of session. Initiated light scapular & postural strengthening exercises with fatigue noted. Issued HEP today and pt demonstrates good understanding of HEP. Time billed reflects time spent one on one with pt.      PMH:  Sheila is a 69 y.o. female referred to outpatient Physical Therapy with a medical diagnosis of Neck and Back Pain. Sheila reports:  Patient saw Dr Tay on 7/16/2024. She reported to him that she has been having neck and back pain that is worsening. X Rays were performed. Cervical X Rays showed degenerative disc changes at C5-6 manifested by anterior osteophytes and generative facet changes from C3 through C6. Lumbar X Rays showed degenerative changes at L3-4, L4-5 and L5-S1, and degenerative facet changes are present from L4 through S1.   Reports cracking and popping in the neck. She has pain in upper neck all the way through the upper traps and between her shoulder blades. Unable to sit in a regular chair for more than a few minutes.   She reports long standing back pain. She fell in 2021 on a wet spot on the  floor and this caused neck and back pain. In May of this year she was doing some work in her yard and was picking up bags of mulch and this caused severe back pain that has not gotten much better.  PMH : Takotsubo cardiomyopathy; Pacemaker   Patient presents with decreased cervical mobility. Her lumbosacral mobility is functional but she has pain/pulling that is felt in the Left shoulder blade and low back during side bending and rotation. She does have some decreased strength in the shoulders and hips. Patient has muscle spasms and trigger points in the cervical region, paraspinals throughout the Cervical, thoracic, and lumbar spine. She has trigger points in the scapular region especially on the Left.   Patient will require Physical Therapy Intervention to address all deficits and work toward completion of all goals set. Therapist will refer patient back to MD upon completion of Therapy for further assessment and possible MRI if pain and dysfunction persist.        Short Term Goals: 4 weeks   1. Patient will be Independent with Home Exercise Program   2. Patient will demonstrate with improved Posture and Body Mechanics  3. Patient will increase Cervical Range of Motion by 10%   4. Patient will decrease complaints of pain with activity to 5/10      Long Term Goals: 8 weeks   1. Patient will tolerate 30 minutes of activity with complaints of Pain at Less Than or Equal to 3/10     Sheila Is progressing well towards her goals.   Patient prognosis is Good.     Patient will continue to benefit from skilled outpatient physical therapy to address the deficits listed in the problem list box on initial evaluation, provide pt/family education and to maximize pt's level of independence in the home and community environment.     Patient's spiritual, cultural and educational needs considered and pt agreeable to plan of care and goals.     Anticipated barriers to physical therapy: None    Goals: Short Term Goals: 4 weeks   1.  Patient will be Independent with Home Exercise Program   2. Patient will demonstrate with improved Posture and Body Mechanics  3. Patient will increase Cervical Range of Motion by 10%   4. Patient will decrease complaints of pain with activity to 5/10      Long Term Goals: 8 weeks   1. Patient will tolerate 30 minutes of activity with complaints of Pain at Less Than or Equal to 3/10     Plan     Plan of care Certification: 7/25/2024 to 9/20/2024.     Outpatient Physical Therapy 2 times weekly for 8 weeks to include the following interventions: 91244 [therapeutic exercise], 17993 [neuromuscular re-education], 49903 [manual therapy], and 07122 [therapeutic activities]    Esdras Guajardo PTA

## 2024-08-06 ENCOUNTER — CLINICAL SUPPORT (OUTPATIENT)
Dept: REHABILITATION | Facility: HOSPITAL | Age: 69
End: 2024-08-06
Payer: COMMERCIAL

## 2024-08-06 DIAGNOSIS — M54.50 ACUTE BILATERAL LOW BACK PAIN WITHOUT SCIATICA: ICD-10-CM

## 2024-08-06 DIAGNOSIS — M54.2 NECK PAIN: Primary | ICD-10-CM

## 2024-08-06 PROCEDURE — 97110 THERAPEUTIC EXERCISES: CPT

## 2024-08-06 PROCEDURE — 97112 NEUROMUSCULAR REEDUCATION: CPT

## 2024-08-06 PROCEDURE — 97140 MANUAL THERAPY 1/> REGIONS: CPT

## 2024-08-08 ENCOUNTER — CLINICAL SUPPORT (OUTPATIENT)
Dept: REHABILITATION | Facility: HOSPITAL | Age: 69
End: 2024-08-08
Payer: COMMERCIAL

## 2024-08-08 DIAGNOSIS — M54.50 ACUTE BILATERAL LOW BACK PAIN WITHOUT SCIATICA: ICD-10-CM

## 2024-08-08 DIAGNOSIS — M54.2 NECK PAIN: Primary | ICD-10-CM

## 2024-08-08 PROCEDURE — 97140 MANUAL THERAPY 1/> REGIONS: CPT | Mod: CQ

## 2024-08-08 PROCEDURE — 97110 THERAPEUTIC EXERCISES: CPT | Mod: CQ

## 2024-08-08 PROCEDURE — 97112 NEUROMUSCULAR REEDUCATION: CPT | Mod: CQ

## 2024-08-13 ENCOUNTER — CLINICAL SUPPORT (OUTPATIENT)
Dept: REHABILITATION | Facility: HOSPITAL | Age: 69
End: 2024-08-13
Payer: COMMERCIAL

## 2024-08-13 DIAGNOSIS — M54.2 NECK PAIN: Primary | ICD-10-CM

## 2024-08-13 DIAGNOSIS — M54.50 ACUTE BILATERAL LOW BACK PAIN WITHOUT SCIATICA: ICD-10-CM

## 2024-08-13 PROCEDURE — 97140 MANUAL THERAPY 1/> REGIONS: CPT | Mod: CQ

## 2024-08-13 PROCEDURE — 97110 THERAPEUTIC EXERCISES: CPT | Mod: CQ

## 2024-08-13 PROCEDURE — 97112 NEUROMUSCULAR REEDUCATION: CPT | Mod: CQ

## 2024-08-13 NOTE — PROGRESS NOTES
[facility]   Physical Therapy Treatment Note      Name: Sheila lakia  Clinic Number: [id number]    Therapy Diagnosis:   Encounter Diagnoses   Name Primary?    Neck pain Yes    Acute bilateral low back pain without sciatica      Physician: David Tay MD    Visit Date: 8/13/2024    Therapy Diagnosis: Neck and Back Pain    Physician: David Tay MD     Physician Orders: PT Eval and Treat   Medical Diagnosis from Referral: Neck and Back Pain   Evaluation Date: 7/25/2024  Authorization Period Expiration: 7/16/2025   Plan of Care Expiration: 9/20/2024   Visit # / Visits authorized: 4 / 17   FOTO: 34/100     Precautions: Pacemaker      Time In: 10:44 am   Time Out: 10:35 am   Total Appointment Time (timed & untimed codes): 25 billable minutes    PTA Visit #: 1/5       Subjective     Patient reports: some stiffness in her neck today   She was issued HEP on 7/30/2024  Response to previous treatment: mild muscle soreness  Functional change: none    Pain: 1/10  Location: bilateral neck      Prior Level of Function: Independent and active   Current Level of Function: Pain in Neck and Back limit her mobility     Objective      Objective Measures updated at progress report unless specified.     Treatment     Sheila received the treatments listed below:      therapeutic exercises to develop strength, endurance, ROM, flexibility, posture, and core stabilization for 8 minutes including:  UBE  x 3 mins   Pulleys  X 3 mins   Corner Stretch   4x20 sec   Upper Trap Stretch  3x20 sec (B)   Cane Flexion on Wall      Cane Flexion off Wall      Cybex Rows - Close      Cybex Rows - Wide                 Cervical Active Range of Motion/Resisted     Flexion     Extension     Side Bending Right and Left      Rotation Right and Left      Protraction/Retraction                          manual therapy techniques: Joint mobilizations, Manual traction, and Myofacial release were applied to the: neck for 8  minutes, including:  MFR  Suboccipital Release  UT/SCM Stretch    neuromuscular re-education activities to improve: Kinesthetic, Proprioception, and Posture for 8 minutes. The following activities were included:        Shoulder Ext/Scap Retraction  Prone 20x5 sec hold   Scap Retraction/Ext with Band  Green 30x   Rows High/Low  3 plates 20x each   Bilateral HA  Red 20x   Open Book  X 5 with 15 sec hold (B)   Bilateral ER  Red 20x    Wall [place]                   Patient Education and Home Exercises       Education provided:   - None    Written Home Exercises Provided: Patient instructed to cont prior HEP. Exercises were reviewed and Sheila was able to demonstrate them prior to the end of the session.  Sheila demonstrated good  understanding of the education provided. See Electronic Medical Record under Patient Instructions for exercises provided during therapy sessions    Assessment     Continued with progression of postural strengthening exercises and mobility exercises with decreased tightness at end of session. Pt gets relief of pain with suboccipital release and R/L SCM/UT stretching. Progressed scapular and postural strengthening exercises with fatigue noted. Time billed reflects time spent one on one with pt.             PMH:  Sheila is a 69 y.o. female referred to outpatient Physical Therapy with a medical diagnosis of Neck and Back Pain. Sheila reports:  Patient saw Dr Tay on 7/16/2024. She reported to him that she has been having neck and back pain that is worsening. X Rays were performed. Cervical X Rays showed degenerative disc changes at C5-6 manifested by anterior osteophytes and generative facet changes from C3 through C6. Lumbar X Rays showed degenerative changes at L3-4, L4-5 and L5-S1, and degenerative facet changes are present from L4 through S1.   Reports cracking and popping in the neck. She has pain in upper neck all the way through the upper traps and between her shoulder blades. Unable to sit  in a regular chair for more than a few minutes.   She reports long standing back pain. She fell in 2021 on a wet spot on the floor and this caused neck and back pain. In May of this year she was doing some work in her yard and was picking up bags of mulch and this caused severe back pain that has not gotten much better.  PMH : Takotsubo cardiomyopathy; Pacemaker   Patient presents with decreased cervical mobility. Her lumbosacral mobility is functional but she has pain/pulling that is felt in the Left shoulder blade and low back during side bending and rotation. She does have some decreased strength in the shoulders and hips. Patient has muscle spasms and trigger points in the cervical region, paraspinals throughout the Cervical, thoracic, and lumbar spine. She has trigger points in the scapular region especially on the Left.   Patient will require Physical Therapy Intervention to address all deficits and work toward completion of all goals set. Therapist will refer patient back to MD upon completion of Therapy for further assessment and possible MRI if pain and dysfunction persist.        Short Term Goals: 4 weeks   1. Patient will be Independent with Home Exercise Program   2. Patient will demonstrate with improved Posture and Body Mechanics  3. Patient will increase Cervical Range of Motion by 10%   4. Patient will decrease complaints of pain with activity to 5/10      Long Term Goals: 8 weeks   1. Patient will tolerate 30 minutes of activity with complaints of Pain at Less Than or Equal to 3/10     Sheila Is progressing well towards her goals.   Patient prognosis is Good.     Patient will continue to benefit from skilled outpatient physical therapy to address the deficits listed in the problem list box on initial evaluation, provide pt/family education and to maximize pt's level of independence in the home and community environment.     Patient's spiritual, cultural and educational needs considered and pt  agreeable to plan of care and goals.     Anticipated barriers to physical therapy: None    Goals: Short Term Goals: 4 weeks   1. Patient will be Independent with Home Exercise Program   2. Patient will demonstrate with improved Posture and Body Mechanics  3. Patient will increase Cervical Range of Motion by 10%   4. Patient will decrease complaints of pain with activity to 5/10      Long Term Goals: 8 weeks   1. Patient will tolerate 30 minutes of activity with complaints of Pain at Less Than or Equal to 3/10     Plan     Plan of care Certification: 7/25/2024 to 9/20/2024.     Outpatient Physical Therapy 2 times weekly for 8 weeks to include the following interventions: 78337 [therapeutic exercise], 58527 [neuromuscular re-education], 58582 [manual therapy], and 47877 [therapeutic activities]    Esdras Guajardo PTA

## 2024-08-16 ENCOUNTER — CLINICAL SUPPORT (OUTPATIENT)
Dept: REHABILITATION | Facility: HOSPITAL | Age: 69
End: 2024-08-16
Payer: COMMERCIAL

## 2024-08-16 DIAGNOSIS — M54.50 ACUTE BILATERAL LOW BACK PAIN WITHOUT SCIATICA: ICD-10-CM

## 2024-08-16 DIAGNOSIS — M54.2 NECK PAIN: Primary | ICD-10-CM

## 2024-08-16 PROCEDURE — 97140 MANUAL THERAPY 1/> REGIONS: CPT | Mod: CQ

## 2024-08-16 PROCEDURE — 97112 NEUROMUSCULAR REEDUCATION: CPT | Mod: CQ

## 2024-08-16 PROCEDURE — 97110 THERAPEUTIC EXERCISES: CPT | Mod: CQ

## 2024-08-16 NOTE — PROGRESS NOTES
[facility]   Physical Therapy Treatment Note      Name: Sheila lakia  Clinic Number: [id number]    Therapy Diagnosis:   Encounter Diagnoses   Name Primary?    Neck pain Yes    Acute bilateral low back pain without sciatica      Physician: David Tay MD    Visit Date: 8/16/2024    Therapy Diagnosis: Neck and Back Pain    Physician: David Tay MD     Physician Orders: PT Eval and Treat   Medical Diagnosis from Referral: Neck and Back Pain   Evaluation Date: 7/25/2024  Authorization Period Expiration: 7/16/2025   Plan of Care Expiration: 9/20/2024   Visit # / Visits authorized: 6 / 17   FOTO: 34/100     Precautions: Pacemaker      Time In: 10:15 am   Time Out: 11:00 am   Total Appointment Time (timed & untimed codes): 45 billable minutes    PTA Visit #: 3/5       Subjective     Patient reports: her neck and shoulders are achey and hurting today  She was issued HEP on 7/30/2024  Response to previous treatment: mild muscle soreness  Functional change: none    Pain: 7/10  Location: bilateral neck      Prior Level of Function: Independent and active   Current Level of Function: Pain in Neck and Back limit her mobility     Objective      Objective Measures updated at progress report unless specified.     Treatment     Sheila received the treatments listed below:      therapeutic exercises to develop strength, endurance, ROM, flexibility, posture, and core stabilization for 9 minutes including:  UBE  x 3 mins   Pulleys  X 3 mins   Corner Stretch   4x20 sec   Upper Trap Stretch  3x20 sec (B)   Cane Flexion on Wall      Cane Flexion off Wall      Cybex Rows - Close      Cybex Rows - Wide                 Cervical Active Range of Motion/Resisted     Flexion     Extension     Side Bending Right and Left      Rotation Right and Left      Protraction/Retraction                          manual therapy techniques: Joint mobilizations, Manual traction, and Myofacial release were applied to  the: neck for 9 minutes, including:  MFR  Suboccipital Release  UT/SCM Stretch    neuromuscular re-education activities to improve: Kinesthetic, Proprioception, and Posture for 23 minutes. The following activities were included:        Shoulder Ext/Scap Retraction  Prone 20x5 sec hold   Scap Retraction/Ext with Band  Green 20x   Rows High/Low  3 plates 30x each   Bilateral HA  Red 20x   Open Book  X 5 with 10 sec hold (B)   Bilateral ER  Red 20x with scapular retraction   Wall [place]                 Patient Education and Home Exercises       Education provided:   - None    Written Home Exercises Provided: Patient instructed to cont prior HEP. Exercises were reviewed and Sheila was able to demonstrate them prior to the end of the session.  Sheila demonstrated good  understanding of the education provided. See Electronic Medical Record under Patient Instructions for exercises provided during therapy sessions    Assessment     Patient reports her shoulders and neck are achey today. Continued with progression of postural strengthening exercises and mobility exercises with decreased tightness at end of session. Pt gets relief of pain with suboccipital release and R/L SCM/UT stretching in supine.  Progressed scapular and postural strengthening exercises with fatigue noted. Encouraged Patient to continue with diligence to home exercise program.     PMH:  Sheila is a 69 y.o. female referred to outpatient Physical Therapy with a medical diagnosis of Neck and Back Pain. Sheila reports:  Patient saw Dr Tay on 7/16/2024. She reported to him that she has been having neck and back pain that is worsening. X Rays were performed. Cervical X Rays showed degenerative disc changes at C5-6 manifested by anterior osteophytes and generative facet changes from C3 through C6. Lumbar X Rays showed degenerative changes at L3-4, L4-5 and L5-S1, and degenerative facet changes are present from L4 through S1.   Reports cracking and popping in  the neck. She has pain in upper neck all the way through the upper traps and between her shoulder blades. Unable to sit in a regular chair for more than a few minutes.   She reports long standing back pain. She fell in 2021 on a wet spot on the floor and this caused neck and back pain. In May of this year she was doing some work in her yard and was picking up bags of mulch and this caused severe back pain that has not gotten much better.  PMH : Takotsubo cardiomyopathy; Pacemaker   Patient presents with decreased cervical mobility. Her lumbosacral mobility is functional but she has pain/pulling that is felt in the Left shoulder blade and low back during side bending and rotation. She does have some decreased strength in the shoulders and hips. Patient has muscle spasms and trigger points in the cervical region, paraspinals throughout the Cervical, thoracic, and lumbar spine. She has trigger points in the scapular region especially on the Left.   Patient will require Physical Therapy Intervention to address all deficits and work toward completion of all goals set. Therapist will refer patient back to MD upon completion of Therapy for further assessment and possible MRI if pain and dysfunction persist.        Short Term Goals: 4 weeks   1. Patient will be Independent with Home Exercise Program   2. Patient will demonstrate with improved Posture and Body Mechanics  3. Patient will increase Cervical Range of Motion by 10%   4. Patient will decrease complaints of pain with activity to 5/10      Long Term Goals: 8 weeks   1. Patient will tolerate 30 minutes of activity with complaints of Pain at Less Than or Equal to 3/10     Sheila Is progressing well towards her goals.   Patient prognosis is Good.     Patient will continue to benefit from skilled outpatient physical therapy to address the deficits listed in the problem list box on initial evaluation, provide pt/family education and to maximize pt's level of independence  in the home and community environment.     Patient's spiritual, cultural and educational needs considered and pt agreeable to plan of care and goals.     Anticipated barriers to physical therapy: None    Goals: Short Term Goals: 4 weeks   1. Patient will be Independent with Home Exercise Program   2. Patient will demonstrate with improved Posture and Body Mechanics  3. Patient will increase Cervical Range of Motion by 10%   4. Patient will decrease complaints of pain with activity to 5/10      Long Term Goals: 8 weeks   1. Patient will tolerate 30 minutes of activity with complaints of Pain at Less Than or Equal to 3/10     Plan     Plan of care Certification: 7/25/2024 to 9/20/2024.     Outpatient Physical Therapy 2 times weekly for 8 weeks to include the following interventions: 95834 [therapeutic exercise], 42510 [neuromuscular re-education], 90122 [manual therapy], and 34385 [therapeutic activities]    Vlad Ordoñez PTA      8/16/2024

## 2024-08-21 ENCOUNTER — CLINICAL SUPPORT (OUTPATIENT)
Dept: REHABILITATION | Facility: HOSPITAL | Age: 69
End: 2024-08-21
Payer: COMMERCIAL

## 2024-08-21 DIAGNOSIS — M54.2 NECK PAIN: Primary | ICD-10-CM

## 2024-08-21 DIAGNOSIS — M54.50 ACUTE BILATERAL LOW BACK PAIN WITHOUT SCIATICA: ICD-10-CM

## 2024-08-21 PROCEDURE — 97140 MANUAL THERAPY 1/> REGIONS: CPT | Mod: CQ

## 2024-08-21 PROCEDURE — 97110 THERAPEUTIC EXERCISES: CPT | Mod: CQ

## 2024-08-21 NOTE — PROGRESS NOTES
[facility]   Physical Therapy Treatment Note      Name: Sheila Ortiz  Clinic Number: [id number]    Therapy Diagnosis:   Encounter Diagnoses   Name Primary?    Neck pain Yes    Acute bilateral low back pain without sciatica      Physician: David Tay MD    Visit Date: 8/21/2024    Therapy Diagnosis: Neck and Back Pain    Physician: David Tay MD     Physician Orders: PT Eval and Treat   Medical Diagnosis from Referral: Neck and Back Pain   Evaluation Date: 7/25/2024  Authorization Period Expiration: 7/16/2025   Plan of Care Expiration: 9/20/2024   Visit # / Visits authorized: 7 / 17   FOTO: 34/100     Precautions: Pacemaker      Time In: 9:59 am   Time Out: 10:38 am   Total Appointment Time (timed & untimed codes): 39 minutes    PTA Visit #: 4/5       Subjective     Patient reports:back feels fine; neck is sore from preparing for classes this week   She was issued HEP on 7/30/2024  Response to previous treatment: mild muscle soreness  Functional change: none    Pain: 7/10  Location: bilateral neck      Prior Level of Function: Independent and active   Current Level of Function: Pain in Neck and Back limit her mobility     Objective      Objective Measures updated at progress report unless specified.     Treatment     Sheila received the treatments listed below:      therapeutic exercises to develop strength, endurance, ROM, flexibility, posture, and core stabilization for 8 minutes including:  UBE  x 3 mins   Pulleys  X 3 mins   Corner Stretch   4x20 sec   Upper Trap Stretch  3x20 sec (B)   Cane Flexion on Wall      Cane Flexion off Wall      Cybex Rows - Close      Cybex Rows - Wide                 Cervical Active Range of Motion/Resisted     Flexion     Extension     Side Bending Right and Left      Rotation Right and Left      Protraction/Retraction                          manual therapy techniques: Joint mobilizations, Manual traction, and Myofacial release were  applied to the: neck for 23 minutes, including:  MFR  Suboccipital Release  UT/SCM Stretch    neuromuscular re-education activities to improve: Kinesthetic, Proprioception, and Posture for 5 minutes. The following activities were included:        Shoulder Ext/Scap Retraction  Prone 20x5 sec hold   Scap Retraction/Ext with Band Rows High/Low Bilateral HA Open Book  X 5 with 10 sec hold (B)   Bilateral ER Wall [place]                 Patient Education and Home Exercises       Education provided:   - None    Written Home Exercises Provided: Patient instructed to cont prior HEP. Exercises were reviewed and Sheila was able to demonstrate them prior to the end of the session.  Sheila demonstrated good  understanding of the education provided. See Electronic Medical Record under Patient Instructions for exercises provided during therapy sessions    Assessment     Decreased stiffness in her neck after manuals and stretching. Deferred postural strengthening exercises due to increased tightness and fatigue this AM. Pt is making good progress towards all established goals. Educated pt to keep up with her HEP. Will reinstate strengthening exercises on next session if decreased pain noted.     PMH:  Sheila is a 69 y.o. female referred to outpatient Physical Therapy with a medical diagnosis of Neck and Back Pain. Sheila reports:  Patient saw Dr Tay on 7/16/2024. She reported to him that she has been having neck and back pain that is worsening. X Rays were performed. Cervical X Rays showed degenerative disc changes at C5-6 manifested by anterior osteophytes and generative facet changes from C3 through C6. Lumbar X Rays showed degenerative changes at L3-4, L4-5 and L5-S1, and degenerative facet changes are present from L4 through S1.   Reports cracking and popping in the neck. She has pain in upper neck all the way through the upper traps and between her shoulder blades. Unable to sit in a regular chair for more than a few  minutes.   She reports long standing back pain. She fell in 2021 on a wet spot on the floor and this caused neck and back pain. In May of this year she was doing some work in her yard and was picking up bags of mulch and this caused severe back pain that has not gotten much better.  PMH : Takotsubo cardiomyopathy; Pacemaker   Patient presents with decreased cervical mobility. Her lumbosacral mobility is functional but she has pain/pulling that is felt in the Left shoulder blade and low back during side bending and rotation. She does have some decreased strength in the shoulders and hips. Patient has muscle spasms and trigger points in the cervical region, paraspinals throughout the Cervical, thoracic, and lumbar spine. She has trigger points in the scapular region especially on the Left.   Patient will require Physical Therapy Intervention to address all deficits and work toward completion of all goals set. Therapist will refer patient back to MD upon completion of Therapy for further assessment and possible MRI if pain and dysfunction persist.        Short Term Goals: 4 weeks   1. Patient will be Independent with Home Exercise Program   2. Patient will demonstrate with improved Posture and Body Mechanics  3. Patient will increase Cervical Range of Motion by 10%   4. Patient will decrease complaints of pain with activity to 5/10      Long Term Goals: 8 weeks   1. Patient will tolerate 30 minutes of activity with complaints of Pain at Less Than or Equal to 3/10     Sheila Is progressing well towards her goals.   Patient prognosis is Good.     Patient will continue to benefit from skilled outpatient physical therapy to address the deficits listed in the problem list box on initial evaluation, provide pt/family education and to maximize pt's level of independence in the home and community environment.     Patient's spiritual, cultural and educational needs considered and pt agreeable to plan of care and goals.      Anticipated barriers to physical therapy: None    Goals: Short Term Goals: 4 weeks   1. Patient will be Independent with Home Exercise Program   2. Patient will demonstrate with improved Posture and Body Mechanics  3. Patient will increase Cervical Range of Motion by 10%   4. Patient will decrease complaints of pain with activity to 5/10      Long Term Goals: 8 weeks   1. Patient will tolerate 30 minutes of activity with complaints of Pain at Less Than or Equal to 3/10     Plan     Plan of care Certification: 7/25/2024 to 9/20/2024.     Outpatient Physical Therapy 2 times weekly for 8 weeks to include the following interventions: 14602 [therapeutic exercise], 27440 [neuromuscular re-education], 04077 [manual therapy], and 84467 [therapeutic activities]    Esdras Guajardo, SANJUANA      8/21/2024

## 2024-08-23 ENCOUNTER — CLINICAL SUPPORT (OUTPATIENT)
Dept: REHABILITATION | Facility: HOSPITAL | Age: 69
End: 2024-08-23
Payer: COMMERCIAL

## 2024-08-23 DIAGNOSIS — M54.50 ACUTE BILATERAL LOW BACK PAIN WITHOUT SCIATICA: ICD-10-CM

## 2024-08-23 DIAGNOSIS — M54.2 NECK PAIN: Primary | ICD-10-CM

## 2024-08-23 PROCEDURE — 97140 MANUAL THERAPY 1/> REGIONS: CPT | Mod: CQ

## 2024-08-23 PROCEDURE — 97110 THERAPEUTIC EXERCISES: CPT | Mod: CQ

## 2024-08-23 PROCEDURE — 97112 NEUROMUSCULAR REEDUCATION: CPT | Mod: CQ

## 2024-08-23 NOTE — PROGRESS NOTES
[facility] OUTPATIENT THERAPY AND WELLNESS   Physical Therapy Treatment Note      Name: Sheila Ortiz  Clinic Number: [id number]    Therapy Diagnosis:   Encounter Diagnoses   Name Primary?    Neck pain Yes    Acute bilateral low back pain without sciatica      Physician: David Tay MD    Visit Date: 8/23/2024    Therapy Diagnosis: Neck and Back Pain    Physician: David Tay MD     Physician Orders: PT Eval and Treat   Medical Diagnosis from Referral: Neck and Back Pain   Evaluation Date: 7/25/2024  Authorization Period Expiration: 7/16/2025   Plan of Care Expiration: 9/20/2024   Visit # / Visits authorized: 8 / 17   PTA Visit #: 5/5   FOTO: 34/100     Precautions: Pacemaker      Time In: 08:00 am   Time Out: 08:46 am   Total Appointment Time (timed & untimed codes): 46 minutes    Subjective     Patient reports: no pain on arrival today. She slept good last night.  She was issued HEP on 7/30/2024  Response to previous treatment: mild muscle soreness  Functional change: none    Pain: 0/10  Location: bilateral neck      Prior Level of Function: Independent and active   Current Level of Function: Pain in Neck and Back limit her mobility     Objective      Objective Measures updated at progress report unless specified.     Treatment     Sheila received the treatments listed below:      therapeutic exercises to develop strength, endurance, ROM, flexibility, posture, and core stabilization for 17 minutes including:  UBE  x 3 mins ea fwd and rev   Pulleys  X 3 mins ea flexion and abduction    Corner Stretch   4x20 sec   Upper Trap Stretch  2x20 sec (B)   Cane Flexion on Wall      Cane Flexion off Wall      Cybex Rows - Close   see neuro below    Cybex Rows - Wide  see neuro below               Cervical Active Range of Motion/Resisted     Flexion     Extension     Side Bending Right and Left      Rotation Right and Left      Protraction/Retraction                          manual therapy techniques: Joint  mobilizations, Manual traction, and Myofacial release were applied to the: neck for 9 minutes, including:  MFR  Suboccipital Release  UT/SCM Stretch    neuromuscular re-education activities to improve: Kinesthetic, Proprioception, and Posture for 20 minutes. The following activities were included:        Bilateral Scap Retraction/Ext with Band  Green 20x   Rows High/Low  3 plates 30x each   Bilateral HA  Red 20x   Open Book  X 5 with 10 sec hold (B)   Bilateral ER  Red 20x with scapular retraction   Wall [place]                 Patient Education and Home Exercises       Education provided:   - None    Written Home Exercises Provided: Patient instructed to cont prior HEP. Exercises were reviewed and Sheila was able to demonstrate them prior to the end of the session.  Sheila demonstrated good  understanding of the education provided. See Electronic Medical Record under Patient Instructions for exercises provided during therapy sessions    Assessment     Patient reports feeling pretty good today on arrival. Decreased stiffness in her neck after manuals and stretching last visit. Added back in postural strengthening exercises today due to Patient's pain level being reduced.  Pt is making good progress towards all established goals. Educated pt to keep up with her home exercise program.     PMH:  Sheila is a 69 y.o. female referred to outpatient Physical Therapy with a medical diagnosis of Neck and Back Pain. Sheila reports:  Patient saw Dr Tay on 7/16/2024. She reported to him that she has been having neck and back pain that is worsening. X Rays were performed. Cervical X Rays showed degenerative disc changes at C5-6 manifested by anterior osteophytes and generative facet changes from C3 through C6. Lumbar X Rays showed degenerative changes at L3-4, L4-5 and L5-S1, and degenerative facet changes are present from L4 through S1.   Reports cracking and popping in the neck. She has pain in upper neck all the way through  the upper traps and between her shoulder blades. Unable to sit in a regular chair for more than a few minutes.   She reports long standing back pain. She fell in 2021 on a wet spot on the floor and this caused neck and back pain. In May of this year she was doing some work in her yard and was picking up bags of mulch and this caused severe back pain that has not gotten much better.  PMH : Takotsubo cardiomyopathy; Pacemaker   Patient presents with decreased cervical mobility. Her lumbosacral mobility is functional but she has pain/pulling that is felt in the Left shoulder blade and low back during side bending and rotation. She does have some decreased strength in the shoulders and hips. Patient has muscle spasms and trigger points in the cervical region, paraspinals throughout the Cervical, thoracic, and lumbar spine. She has trigger points in the scapular region especially on the Left.   Patient will require Physical Therapy Intervention to address all deficits and work toward completion of all goals set. Therapist will refer patient back to MD upon completion of Therapy for further assessment and possible MRI if pain and dysfunction persist.        Short Term Goals: 4 weeks   1. Patient will be Independent with Home Exercise Program   2. Patient will demonstrate with improved Posture and Body Mechanics  3. Patient will increase Cervical Range of Motion by 10%   4. Patient will decrease complaints of pain with activity to 5/10      Long Term Goals: 8 weeks   1. Patient will tolerate 30 minutes of activity with complaints of Pain at Less Than or Equal to 3/10     Sheila Is progressing well towards her goals.   Patient prognosis is Good.     Patient will continue to benefit from skilled outpatient physical therapy to address the deficits listed in the problem list box on initial evaluation, provide pt/family education and to maximize pt's level of independence in the home and community environment.     Patient's  spiritual, cultural and educational needs considered and pt agreeable to plan of care and goals.     Anticipated barriers to physical therapy: None    Goals: Short Term Goals: 4 weeks   1. Patient will be Independent with Home Exercise Program   2. Patient will demonstrate with improved Posture and Body Mechanics  3. Patient will increase Cervical Range of Motion by 10%   4. Patient will decrease complaints of pain with activity to 5/10      Long Term Goals: 8 weeks   1. Patient will tolerate 30 minutes of activity with complaints of Pain at Less Than or Equal to 3/10     Plan     Plan of care Certification: 7/25/2024 to 9/20/2024.     Outpatient Physical Therapy 2 times weekly for 8 weeks to include the following interventions: 13559 [therapeutic exercise], 50293 [neuromuscular re-education], 51660 [manual therapy], and 53630 [therapeutic activities]    Vlad Ordoñez, SANJUANA      8/23/2024

## 2024-08-27 ENCOUNTER — CLINICAL SUPPORT (OUTPATIENT)
Dept: REHABILITATION | Facility: HOSPITAL | Age: 69
End: 2024-08-27
Payer: COMMERCIAL

## 2024-08-27 DIAGNOSIS — M54.50 ACUTE BILATERAL LOW BACK PAIN WITHOUT SCIATICA: ICD-10-CM

## 2024-08-27 DIAGNOSIS — M54.2 NECK PAIN: Primary | ICD-10-CM

## 2024-08-27 PROCEDURE — 97110 THERAPEUTIC EXERCISES: CPT | Mod: CQ

## 2024-08-27 PROCEDURE — 97112 NEUROMUSCULAR REEDUCATION: CPT

## 2024-08-27 PROCEDURE — 97140 MANUAL THERAPY 1/> REGIONS: CPT | Mod: CQ

## 2024-08-27 NOTE — PROGRESS NOTES
[facility] OUTPATIENT THERAPY AND WELLNESS   Physical Therapy Treatment Note      Name: Sheila [facility]  Clinic Number: [id number]    Therapy Diagnosis:   Encounter Diagnoses   Name Primary?    Neck pain Yes    Acute bilateral low back pain without sciatica      Physician: David Tay MD    Visit Date: 8/27/2024    Therapy Diagnosis: Neck and Back Pain    Physician: David Tay MD     Physician Orders: PT Eval and Treat   Medical Diagnosis from Referral: Neck and Back Pain   Evaluation Date: 7/25/2024  Authorization Period Expiration: 7/16/2025   Plan of Care Expiration: 9/20/2024   Visit # / Visits authorized: 9 / 17   PTA Visit #: 1/5   FOTO: 34/100 ; 48/100 on 8/27/2024     Precautions: Pacemaker      Time In: 7:35 am (Mariama Burgess, PT, DPT)  Time Out: 7:43 am (Mariama Burgess, PT, DPT)  Time (Esdras Guajardo PTA) 7:22 am - 7:34 am ; 7:44 am - 8:08 am     Total Appointment Time (timed & untimed codes): 46 minutes    Subjective     Patient reports: feeling good; no pain  She was issued HEP on 7/30/2024  Response to previous treatment: mild muscle soreness  Functional change: none    Pain: 0/10  Location: bilateral neck      Prior Level of Function: Independent and active   Current Level of Function: Pain in Neck and Back limit her mobility     Objective      Objective Measures updated at progress report unless specified.     Treatment     Sheila received the treatments listed below:      therapeutic exercises to develop strength, endurance, ROM, flexibility, posture, and core stabilization for 13 minutes including:  UBE  x 2 mins ea fwd and rev   Pulleys  X 2 mins ea flexion   Corner Stretch   4x20 sec   Upper Trap Stretch  2x20 sec (B)   Cane Flexion on Wall      Cane Flexion off Wall      Cybex Rows - Close   see neuro below    Cybex Rows - Wide  see neuro below               Cervical Active Range of Motion/Resisted     Flexion     Extension     Side Bending Right and Left      Rotation Right and Left       Protraction/Retraction                          manual therapy techniques: Joint mobilizations, Manual traction, and Myofacial release were applied to the: neck for 10 minutes, including:  MFR  Suboccipital Release  UT/SCM Stretch    neuromuscular re-education activities to improve: Kinesthetic, Proprioception, and Posture for 23 minutes. The following activities were included:        Shoulder Ext/Scap Retraction  Prone 20x5 sec hold   Bilateral Scap Retraction/Ext with Band  Green 20x   Rows High/Low  3 plates 30x each   Bilateral HA  Red 20x   Open Book  X 5 with 10 sec hold (B)   Bilateral ER  Red 20x with scapular retraction   Wall [place]                 Patient Education and Home Exercises       Education provided:   - None    Written Home Exercises Provided: Patient instructed to cont prior HEP. Exercises were reviewed and Sheila was able to demonstrate them prior to the end of the session.  Sheila demonstrated good  understanding of the education provided. See Electronic Medical Record under Patient Instructions for exercises provided during therapy sessions    Assessment     Supervisory visit performed by Mariama Burgess, PT, DPT and time spent with pt noted in Red Text. Pt is doing well and has met all of STGs. Pt doing well and tolerated all postural strengthening exercises with fatigue noted. Pt making good progress towards remaining LTGs. Will continue to progress as able. Educated pt to be diligent with her HEP.      PMH:  Sheila is a 69 y.o. female referred to outpatient Physical Therapy with a medical diagnosis of Neck and Back Pain. Sheila reports:  Patient saw Dr Tay on 7/16/2024. She reported to him that she has been having neck and back pain that is worsening. X Rays were performed. Cervical X Rays showed degenerative disc changes at C5-6 manifested by anterior osteophytes and generative facet changes from C3 through C6. Lumbar X Rays showed degenerative changes at L3-4, L4-5 and L5-S1, and  degenerative facet changes are present from L4 through S1.   Reports cracking and popping in the neck. She has pain in upper neck all the way through the upper traps and between her shoulder blades. Unable to sit in a regular chair for more than a few minutes.   She reports long standing back pain. She fell in 2021 on a wet spot on the floor and this caused neck and back pain. In May of this year she was doing some work in her yard and was picking up bags of mulch and this caused severe back pain that has not gotten much better.  PMH : Takotsubo cardiomyopathy; Pacemaker   Patient presents with decreased cervical mobility. Her lumbosacral mobility is functional but she has pain/pulling that is felt in the Left shoulder blade and low back during side bending and rotation. She does have some decreased strength in the shoulders and hips. Patient has muscle spasms and trigger points in the cervical region, paraspinals throughout the Cervical, thoracic, and lumbar spine. She has trigger points in the scapular region especially on the Left.   Patient will require Physical Therapy Intervention to address all deficits and work toward completion of all goals set. Therapist will refer patient back to MD upon completion of Therapy for further assessment and possible MRI if pain and dysfunction persist.        Short Term Goals: 4 weeks   1. Patient will be Independent with Home Exercise Program   2. Patient will demonstrate with improved Posture and Body Mechanics  3. Patient will increase Cervical Range of Motion by 10%   4. Patient will decrease complaints of pain with activity to 5/10      Long Term Goals: 8 weeks   1. Patient will tolerate 30 minutes of activity with complaints of Pain at Less Than or Equal to 3/10     Sheila Is progressing well towards her goals.   Patient prognosis is Good.     Patient will continue to benefit from skilled outpatient physical therapy to address the deficits listed in the problem list box  on initial evaluation, provide pt/family education and to maximize pt's level of independence in the home and community environment.     Patient's spiritual, cultural and educational needs considered and pt agreeable to plan of care and goals.     Anticipated barriers to physical therapy: None    Goals: Short Term Goals: 4 weeks   1. Patient will be Independent with Home Exercise Program MET  2. Patient will demonstrate with improved Posture and Body Mechanics MET  3. Patient will increase Cervical Range of Motion by 10%  MET  4. Patient will decrease complaints of pain with activity to 5/10  MET     Long Term Goals: 8 weeks   1. Patient will tolerate 30 minutes of activity with complaints of Pain at Less Than or Equal to 3/10 ONGOING    Plan     Plan of care Certification: 7/25/2024 to 9/20/2024.     Outpatient Physical Therapy 2 times weekly for 8 weeks to include the following interventions: 80966 [therapeutic exercise], 83556 [neuromuscular re-education], 10979 [manual therapy], and 23058 [therapeutic activities]    Esdras Guajardo, SANJUANA      8/27/2024

## 2024-08-28 NOTE — PROGRESS NOTES
[facility]   Physical Therapy Treatment Note      Name: Sheila lakia  Clinic Number: [id number]    Therapy Diagnosis:   Encounter Diagnoses   Name Primary?    Neck pain Yes    Acute bilateral low back pain without sciatica      Physician: David Tay MD    Visit Date: 8/27/2024    Therapy Diagnosis: Neck and Back Pain    Physician: David Tay MD     Physician Orders: PT Eval and Treat   Medical Diagnosis from Referral: Neck and Back Pain   Evaluation Date: 7/25/2024  Authorization Period Expiration: 7/16/2025   Plan of Care Expiration: 9/20/2024   Visit # / Visits authorized: 9 / 17   PTA Visit #: 1/5   FOTO: 34/100 ; 48/100 on 8/27/2024     Precautions: Pacemaker      Time In: 7:35 am (Mariama Ealuis, PT, DPT)  Time Out: 7:43 am (Mariama Burgess, PT, DPT)       Total Appointment Time (timed & untimed codes): 8 minutes as part of the Supervisory Visit     Subjective     Patient reports: feeling good; no pain  She was issued HEP on 7/30/2024  Response to previous treatment: mild muscle soreness  Functional change: none    Pain: 0/10  Location: bilateral neck      Prior Level of Function: Independent and active   Current Level of Function: Pain in Neck and Back limit her mobility     Objective      Objective Measures updated at progress report unless specified.     Treatment     Sheila received the treatments listed below:      therapeutic exercises to develop strength, endurance, ROM, flexibility, posture, and core stabilization for 0 minutes including:  UBE  x 2 mins ea fwd and rev   Pulleys  X 2 mins ea flexion   Corner Stretch   4x20 sec   Upper Trap Stretch  2x20 sec (B)   Cane Flexion on Wall      Cane Flexion off Wall      Cybex Rows - Close   see neuro below    Cybex Rows - Wide  see neuro below               Cervical Active Range of Motion/Resisted     Flexion     Extension     Side Bending Right and Left      Rotation Right and Left      Protraction/Retraction                           manual therapy techniques: Joint mobilizations, Manual traction, and Myofacial release were applied to the: neck for 0 minutes, including:  MFR  Suboccipital Release  UT/SCM Stretch    neuromuscular re-education activities to improve: Kinesthetic, Proprioception, and Posture for 8 minutes. The following activities were included:    Exercises in red performed by Mariama Burgess, PT, DPT as part of the supervisory visit          Shoulder Ext/Scap Retraction  Prone 20x5 sec hold   Bilateral Scap Retraction/Ext with Band  Green 20x   Rows High/Low  3 plates 30x each   Bilateral HA  Red 20x   Open Book  X 5 with 10 sec hold (B)   Bilateral ER  Red 20x with scapular retraction   Wall [place]                 Patient Education and Home Exercises       Education provided:   - None    Written Home Exercises Provided: Patient instructed to cont prior HEP. Exercises were reviewed and Sheila was able to demonstrate them prior to the end of the session.  Sheila demonstrated good  understanding of the education provided. See Electronic Medical Record under Patient Instructions for exercises provided during therapy sessions    Assessment     Patient is doing well with Therapy and making good progress. She does still remain with neck pain but is responding well to the current Plan of Care. We will continue to progress her as tolerated. Sup Visit performed today with BRONSON Wilson, BRONSON Rothman, and BRONSON Bethea.  All goals and treatment plan reviewed. Will work toward completion of all goals set.         PMH:  Sheila is a 69 y.o. female referred to outpatient Physical Therapy with a medical diagnosis of Neck and Back Pain. Sheila reports:  Patient saw Dr Tay on 7/16/2024. She reported to him that she has been having neck and back pain that is worsening. X Rays were performed. Cervical X Rays showed degenerative disc changes at C5-6 manifested by anterior osteophytes and generative facet changes from  C3 through C6. Lumbar X Rays showed degenerative changes at L3-4, L4-5 and L5-S1, and degenerative facet changes are present from L4 through S1.   Reports cracking and popping in the neck. She has pain in upper neck all the way through the upper traps and between her shoulder blades. Unable to sit in a regular chair for more than a few minutes.   She reports long standing back pain. She fell in 2021 on a wet spot on the floor and this caused neck and back pain. In May of this year she was doing some work in her yard and was picking up bags of mulch and this caused severe back pain that has not gotten much better.  PMH : Takotsubo cardiomyopathy; Pacemaker   Patient presents with decreased cervical mobility. Her lumbosacral mobility is functional but she has pain/pulling that is felt in the Left shoulder blade and low back during side bending and rotation. She does have some decreased strength in the shoulders and hips. Patient has muscle spasms and trigger points in the cervical region, paraspinals throughout the Cervical, thoracic, and lumbar spine. She has trigger points in the scapular region especially on the Left.   Patient will require Physical Therapy Intervention to address all deficits and work toward completion of all goals set. Therapist will refer patient back to MD upon completion of Therapy for further assessment and possible MRI if pain and dysfunction persist.        Short Term Goals: 4 weeks   1. Patient will be Independent with Home Exercise Program   2. Patient will demonstrate with improved Posture and Body Mechanics  3. Patient will increase Cervical Range of Motion by 10%   4. Patient will decrease complaints of pain with activity to 5/10      Long Term Goals: 8 weeks   1. Patient will tolerate 30 minutes of activity with complaints of Pain at Less Than or Equal to 3/10     Sheila Is progressing well towards her goals.   Patient prognosis is Good.     Patient will continue to benefit from  skilled outpatient physical therapy to address the deficits listed in the problem list box on initial evaluation, provide pt/family education and to maximize pt's level of independence in the home and community environment.     Patient's spiritual, cultural and educational needs considered and pt agreeable to plan of care and goals.     Anticipated barriers to physical therapy: None    Goals: Short Term Goals: 4 weeks   1. Patient will be Independent with Home Exercise Program MET  2. Patient will demonstrate with improved Posture and Body Mechanics MET  3. Patient will increase Cervical Range of Motion by 10%  MET  4. Patient will decrease complaints of pain with activity to 5/10  MET     Long Term Goals: 8 weeks   1. Patient will tolerate 30 minutes of activity with complaints of Pain at Less Than or Equal to 3/10 ONGOING    Plan     Plan of care Certification: 7/25/2024 to 9/20/2024.     Outpatient Physical Therapy 2 times weekly for 8 weeks to include the following interventions: 66611 [therapeutic exercise], 93664 [neuromuscular re-education], 51732 [manual therapy], and 11959 [therapeutic activities]    ADAM GARZA, PT      8/27/2024

## 2024-09-05 ENCOUNTER — CLINICAL SUPPORT (OUTPATIENT)
Dept: REHABILITATION | Facility: HOSPITAL | Age: 69
End: 2024-09-05
Payer: COMMERCIAL

## 2024-09-05 DIAGNOSIS — M54.50 ACUTE BILATERAL LOW BACK PAIN WITHOUT SCIATICA: ICD-10-CM

## 2024-09-05 DIAGNOSIS — M54.2 NECK PAIN: Primary | ICD-10-CM

## 2024-09-05 PROCEDURE — 97140 MANUAL THERAPY 1/> REGIONS: CPT | Mod: CQ

## 2024-09-05 PROCEDURE — 97110 THERAPEUTIC EXERCISES: CPT | Mod: CQ

## 2024-09-05 PROCEDURE — 97112 NEUROMUSCULAR REEDUCATION: CPT | Mod: CQ

## 2024-09-05 NOTE — PROGRESS NOTES
[facility]   Physical Therapy Treatment Note      Name: Sheila Ortiz  Clinic Number: [id number]    Therapy Diagnosis:   Encounter Diagnoses   Name Primary?    Neck pain Yes    Acute bilateral low back pain without sciatica      Physician: David Tay MD    Visit Date: 9/5/2024    Therapy Diagnosis: Neck and Back Pain    Physician: David Tay MD     Physician Orders: PT Eval and Treat   Medical Diagnosis from Referral: Neck and Back Pain   Evaluation Date: 7/25/2024  Authorization Period Expiration: 7/16/2025   Plan of Care Expiration: 9/20/2024   Visit # / Visits authorized: 10 / 17   PTA Visit #: 2/5   FOTO: 34/100 ; 48/100 on 8/27/2024     Precautions: Pacemaker      Time In: 10:00 am  Time Out: 10:50 am  Total Appointment Time (timed & untimed codes): 50 minutes     Subjective     Patient reports: been on the computer a lot so she is a little sore. 5/10 pain today. No pain meds this morning. Will be out of town until 9/21 and will return to therapy the week of 9/23.   She was issued HEP on 7/30/2024  Response to previous treatment: mild muscle soreness  Functional change: none    Pain: 5/10  Location: bilateral neck      Prior Level of Function: Independent and active   Current Level of Function: Pain in Neck and Back limit her mobility     Objective      Objective Measures updated at progress report unless specified.     Treatment     Sheila received the treatments listed below:      therapeutic exercises to develop strength, endurance, ROM, flexibility, posture, and core stabilization for 12 minutes including:  UBE  x 2 mins ea fwd and rev   Pulleys  X 2 mins ea flexion   Corner Stretch   4x20 sec   Upper Trap Stretch  2x20 sec (B)   Cane Flexion on Wall      Cane Flexion off Wall      Cybex Rows - Close   see neuro below    Cybex Rows - Wide  see neuro below               Cervical Active Range of Motion/Resisted     Flexion     Extension     Side Bending Right and  Left      Rotation Right and Left      Protraction/Retraction                     manual therapy techniques: Joint mobilizations, Manual traction, and Myofacial release were applied to the: neck for 12 minutes, including:  MFR  Suboccipital Release  UT/SCM Stretch    neuromuscular re-education activities to improve: Kinesthetic, Proprioception, and Posture for 23 minutes. The following activities were included:          Shoulder Ext/Scap Retraction  Prone 20x5 sec hold   Bilateral Scap Retraction/Ext with Band  Green 30x   Rows High/Low  3 plates 30x each   Bilateral HA  Red 20x   Open Book  X 10 with 10 sec hold (B)   Bilateral ER  Red 20x with scapular retraction   Wall [place]                 Patient Education and Home Exercises       Education provided:   - None    Written Home Exercises Provided: Patient instructed to cont prior HEP. Exercises were reviewed and Sheila was able to demonstrate them prior to the end of the session.  Sheila demonstrated good  understanding of the education provided. See Electronic Medical Record under Patient Instructions for exercises provided during therapy sessions    Assessment     Patient reports she has been on the computer a lot which has caused soreness in her neck today rated  5/10. She has not taken pain meds this morning. Pt is doing well with Therapy and making good progress. She does still remain with neck pain but is responding well to the current Plan of Care. We will continue to progress her as tolerated. Patient will be out of town until 9/21 and will return to therapy the week of 9/23.     PMH:  Sheila is a 69 y.o. female referred to outpatient Physical Therapy with a medical diagnosis of Neck and Back Pain. Sheila reports:  Patient saw Dr Tay on 7/16/2024. She reported to him that she has been having neck and back pain that is worsening. X Rays were performed. Cervical X Rays showed degenerative disc changes at C5-6 manifested by anterior osteophytes and  generative facet changes from C3 through C6. Lumbar X Rays showed degenerative changes at L3-4, L4-5 and L5-S1, and degenerative facet changes are present from L4 through S1.   Reports cracking and popping in the neck. She has pain in upper neck all the way through the upper traps and between her shoulder blades. Unable to sit in a regular chair for more than a few minutes.   She reports long standing back pain. She fell in 2021 on a wet spot on the floor and this caused neck and back pain. In May of this year she was doing some work in her yard and was picking up bags of mulch and this caused severe back pain that has not gotten much better.  PMH : Takotsubo cardiomyopathy; Pacemaker   Patient presents with decreased cervical mobility. Her lumbosacral mobility is functional but she has pain/pulling that is felt in the Left shoulder blade and low back during side bending and rotation. She does have some decreased strength in the shoulders and hips. Patient has muscle spasms and trigger points in the cervical region, paraspinals throughout the Cervical, thoracic, and lumbar spine. She has trigger points in the scapular region especially on the Left.   Patient will require Physical Therapy Intervention to address all deficits and work toward completion of all goals set. Therapist will refer patient back to MD upon completion of Therapy for further assessment and possible MRI if pain and dysfunction persist.        Short Term Goals: 4 weeks   1. Patient will be Independent with Home Exercise Program   2. Patient will demonstrate with improved Posture and Body Mechanics  3. Patient will increase Cervical Range of Motion by 10%   4. Patient will decrease complaints of pain with activity to 5/10      Long Term Goals: 8 weeks   1. Patient will tolerate 30 minutes of activity with complaints of Pain at Less Than or Equal to 3/10     Sheila Is progressing well towards her goals.   Patient prognosis is Good.     Patient will  continue to benefit from skilled outpatient physical therapy to address the deficits listed in the problem list box on initial evaluation, provide pt/family education and to maximize pt's level of independence in the home and community environment.     Patient's spiritual, cultural and educational needs considered and pt agreeable to plan of care and goals.     Anticipated barriers to physical therapy: None    Goals: Short Term Goals: 4 weeks   1. Patient will be Independent with Home Exercise Program MET  2. Patient will demonstrate with improved Posture and Body Mechanics MET  3. Patient will increase Cervical Range of Motion by 10%  MET  4. Patient will decrease complaints of pain with activity to 5/10  MET     Long Term Goals: 8 weeks   1. Patient will tolerate 30 minutes of activity with complaints of Pain at Less Than or Equal to 3/10 ONGOING    Plan     Plan of care Certification: 7/25/2024 to 9/20/2024.     Outpatient Physical Therapy 2 times weekly for 8 weeks to include the following interventions: 11636 [therapeutic exercise], 97657 [neuromuscular re-education], 83542 [manual therapy], and 60048 [therapeutic activities]    Vlad Ordoñez PTA      9/5/2024

## 2024-09-24 ENCOUNTER — CLINICAL SUPPORT (OUTPATIENT)
Dept: REHABILITATION | Facility: HOSPITAL | Age: 69
End: 2024-09-24
Payer: COMMERCIAL

## 2024-09-24 DIAGNOSIS — M54.2 NECK PAIN: Primary | ICD-10-CM

## 2024-09-24 DIAGNOSIS — M54.50 ACUTE BILATERAL LOW BACK PAIN WITHOUT SCIATICA: ICD-10-CM

## 2024-09-24 PROCEDURE — 97110 THERAPEUTIC EXERCISES: CPT | Mod: CQ

## 2024-09-24 PROCEDURE — 97112 NEUROMUSCULAR REEDUCATION: CPT

## 2024-09-24 PROCEDURE — 97140 MANUAL THERAPY 1/> REGIONS: CPT | Mod: CQ

## 2024-09-24 NOTE — PROGRESS NOTES
[facility]   Physical Therapy Treatment Note      Name: Sheila Ortiz  Clinic Number: [id number]    Therapy Diagnosis:   Encounter Diagnoses   Name Primary?    Neck pain Yes    Acute bilateral low back pain without sciatica        Physician: David Tay MD    Visit Date: 9/24/2024    Therapy Diagnosis: Neck and Back Pain    Physician: David Tay MD     Physician Orders: PT Eval and Treat   Medical Diagnosis from Referral: Neck and Back Pain   Evaluation Date: 7/25/2024  Authorization Period Expiration: 7/16/2025   Plan of Care Expiration: 11/8/2024   Visit # / Visits authorized: 11 / 17   PTA Visit #: 2/5   FOTO: 34/100 ; 48/100 on 8/27/2024     Precautions: Pacemaker      Time In: 10:02 am  Time Out: 10:43 am  Total Appointment Time (timed & untimed codes): 41 minutes     Subjective     Patient reports: been on the computer a lot so she is a little sore. 5/10 pain today. No pain meds this morning. Will be out of town until 9/21 and will return to therapy the week of 9/23.   She was issued HEP on 7/30/2024  Response to previous treatment: mild muscle soreness  Functional change: none    Pain: 5/10  Location: bilateral neck      Prior Level of Function: Independent and active   Current Level of Function: Pain in Neck and Back limit her mobility     Objective      Objective Measures updated at progress report unless specified.     Treatment     Sheila received the treatments listed below:      therapeutic exercises to develop strength, endurance, ROM, flexibility, posture, and core stabilization for 15 minutes including:  UBE  x 3 mins ea fwd and rev   Pulleys  X 2 mins ea flexion   Corner Stretch   4x20 sec   Upper Trap Stretch  2x20 sec (B)   Open Book  10x10 sec (B)   Cane Flexion off Wall      Cybex Rows - Close   see neuro below    Cybex Rows - Wide  see neuro below               Cervical Active Range of Motion/Resisted     Flexion     Extension     Side Bending Right and  Left      Rotation Right and Left      Protraction/Retraction                     manual therapy techniques: Joint mobilizations, Manual traction, and Myofacial release were applied to the: neck for 23 minutes, including:  MFR  Suboccipital Release  UT/SCM Stretch    neuromuscular re-education activities to improve: Kinesthetic, Proprioception, and Posture for 0 minutes. The following activities were included:          Shoulder Ext/Scap Retraction  Prone 20x5 sec hold   Bilateral Scap Retraction/Ext with Band  Green 30x   Rows High/Low  3 plates 30x each   Bilateral HA  Red 20x   Open Book  X 10 with 10 sec hold (B)   Bilateral ER  Red 20x with scapular retraction   Wall [place]                 Patient Education and Home Exercises       Education provided:   - None    Written Home Exercises Provided: Patient instructed to cont prior HEP. Exercises were reviewed and Sheila was able to demonstrate them prior to the end of the session.  Sheila demonstrated good  understanding of the education provided. See Electronic Medical Record under Patient Instructions for exercises provided during therapy sessions    Assessment     Pt having some increased pain due to being out of town for a few weeks. Focused more on mobility and stretching today with decreased stiffness and pain at end of session. Pt has one more visit and will be DC from PT. Will progress as able.     PMH:  Sheila is a 69 y.o. female referred to outpatient Physical Therapy with a medical diagnosis of Neck and Back Pain. Sheila reports:  Patient saw Dr Tay on 7/16/2024. She reported to him that she has been having neck and back pain that is worsening. X Rays were performed. Cervical X Rays showed degenerative disc changes at C5-6 manifested by anterior osteophytes and generative facet changes from C3 through C6. Lumbar X Rays showed degenerative changes at L3-4, L4-5 and L5-S1, and degenerative facet changes are present from L4 through S1.   Reports cracking  and popping in the neck. She has pain in upper neck all the way through the upper traps and between her shoulder blades. Unable to sit in a regular chair for more than a few minutes.   She reports long standing back pain. She fell in 2021 on a wet spot on the floor and this caused neck and back pain. In May of this year she was doing some work in her yard and was picking up bags of mulch and this caused severe back pain that has not gotten much better.  PMH : Takotsubo cardiomyopathy; Pacemaker   Patient presents with decreased cervical mobility. Her lumbosacral mobility is functional but she has pain/pulling that is felt in the Left shoulder blade and low back during side bending and rotation. She does have some decreased strength in the shoulders and hips. Patient has muscle spasms and trigger points in the cervical region, paraspinals throughout the Cervical, thoracic, and lumbar spine. She has trigger points in the scapular region especially on the Left.   Patient will require Physical Therapy Intervention to address all deficits and work toward completion of all goals set. Therapist will refer patient back to MD upon completion of Therapy for further assessment and possible MRI if pain and dysfunction persist.        Short Term Goals: 4 weeks   1. Patient will be Independent with Home Exercise Program   2. Patient will demonstrate with improved Posture and Body Mechanics  3. Patient will increase Cervical Range of Motion by 10%   4. Patient will decrease complaints of pain with activity to 5/10      Long Term Goals: 8 weeks   1. Patient will tolerate 30 minutes of activity with complaints of Pain at Less Than or Equal to 3/10     Sheila Is progressing well towards her goals.   Patient prognosis is Good.     Patient will continue to benefit from skilled outpatient physical therapy to address the deficits listed in the problem list box on initial evaluation, provide pt/family education and to maximize pt's level  of independence in the home and community environment.     Patient's spiritual, cultural and educational needs considered and pt agreeable to plan of care and goals.     Anticipated barriers to physical therapy: None    Goals: Short Term Goals: 4 weeks   1. Patient will be Independent with Home Exercise Program MET  2. Patient will demonstrate with improved Posture and Body Mechanics MET  3. Patient will increase Cervical Range of Motion by 10%  MET  4. Patient will decrease complaints of pain with activity to 5/10  MET     Long Term Goals: 8 weeks   1. Patient will tolerate 30 minutes of activity with complaints of Pain at Less Than or Equal to 3/10 ONGOING    Plan     Updated Plan of care Certification: 9/24/2024 to 11/8/2024.     Outpatient Physical Therapy 2 times weekly for 6 weeks to include the following interventions: 52874 [therapeutic exercise], 40189 [neuromuscular re-education], 68002 [manual therapy], and 00868 [therapeutic activities]    Esdras Guajardo, PTA     9/24/2024

## 2024-09-26 NOTE — PLAN OF CARE
[facility]   Physical Therapy Updated Plan of Care      Name: Sheila Ortiz  Clinic Number: [id number]    Therapy Diagnosis:   Encounter Diagnoses   Name Primary?    Neck pain Yes    Acute bilateral low back pain without sciatica        Physician: David Tay MD    Visit Date: 9/24/2024    Therapy Diagnosis: Neck and Back Pain    Physician: David Tay MD     Physician Orders: PT Eval and Treat   Medical Diagnosis from Referral: Neck and Back Pain   Evaluation Date: 7/25/2024  Authorization Period Expiration: 7/16/2025   Plan of Care Expiration: 11/8/2024   Visit # / Visits authorized: 11 / 17   PTA Visit #: 2/5   FOTO: 34/100 ; 48/100 on 8/27/2024     Precautions: Pacemaker      Time In: 9:50 am  Time Out: 10:00 am  Total Appointment Time (timed & untimed codes): 10 minutes     Subjective     Patient reports: been on the computer a lot so she is a little sore. 5/10 pain today. No pain meds this morning. Will be out of town until 9/21 and will return to therapy the week of 9/23.   She was issued HEP on 7/30/2024  Response to previous treatment: mild muscle soreness  Functional change: none    Pain: 5/10  Location: bilateral neck      Prior Level of Function: Independent and active   Current Level of Function: Pain in Neck and Back limit her mobility     Objective      Objective Measures updated at progress report unless specified.     Treatment     Sheila received the treatments listed below:      therapeutic exercises to develop strength, endurance, ROM, flexibility, posture, and core stabilization for 0 minutes including:  UBE  x 3 mins ea fwd and rev   Pulleys  X 2 mins ea flexion   Corner Stretch   4x20 sec   Upper Trap Stretch  2x20 sec (B)   Open Book  10x10 sec (B)   Cane Flexion off Wall      Cybex Rows - Close   see neuro below    Cybex Rows - Wide  see neuro below               Cervical Active Range of Motion/Resisted     Flexion     Extension     Side Bending Right  and Left      Rotation Right and Left      Protraction/Retraction                     manual therapy techniques: Joint mobilizations, Manual traction, and Myofacial release were applied to the: neck for  minutes, including:  MFR  Suboccipital Release  UT/SCM Stretch    neuromuscular re-education activities to improve: Kinesthetic, Proprioception, and Posture for 10 minutes. The following activities were included:          Shoulder Ext/Scap Retraction  Prone 20x5 sec hold   Bilateral Scap Retraction/Ext with Band  Green 30x   Rows High/Low  3 plates 30x each   Bilateral HA  Red 20x   Open Book  X 10 with 10 sec hold (B)   Bilateral ER  Red 20x with scapular retraction   Wall [place]                 Patient Education and Home Exercises       Education provided:   - None    Written Home Exercises Provided: Patient instructed to cont prior HEP. Exercises were reviewed and Sheila was able to demonstrate them prior to the end of the session.  Sheila demonstrated good  understanding of the education provided. See Electronic Medical Record under Patient Instructions for exercises provided during therapy sessions    Assessment     Patient has been out of town but is now back and ready to resume Therapy. Therapist performed updated Plan of Care today to extend her dates so we can continue to progress her toward all goals set. She still has pain and stiffness in the neck rated at 5/10. We plan to continue to work with her on increasing strength of postural muscles and stretching of the anterior chain in order to provide better cervical and thoracic alignment. Sup Visit performed today with BRONSON Wilson, BRONSON Rothman, and BRONSON Bethea.  All goals and treatment plan reviewed. Will work toward completion of all goals set.        PMH:  Sheila is a 69 y.o. female referred to outpatient Physical Therapy with a medical diagnosis of Neck and Back Pain. Sheila reports:  Patient saw Dr Tay on 7/16/2024. She reported  to him that she has been having neck and back pain that is worsening. X Rays were performed. Cervical X Rays showed degenerative disc changes at C5-6 manifested by anterior osteophytes and generative facet changes from C3 through C6. Lumbar X Rays showed degenerative changes at L3-4, L4-5 and L5-S1, and degenerative facet changes are present from L4 through S1.   Reports cracking and popping in the neck. She has pain in upper neck all the way through the upper traps and between her shoulder blades. Unable to sit in a regular chair for more than a few minutes.   She reports long standing back pain. She fell in 2021 on a wet spot on the floor and this caused neck and back pain. In May of this year she was doing some work in her yard and was picking up bags of mulch and this caused severe back pain that has not gotten much better.  PMH : Takotsubo cardiomyopathy; Pacemaker   Patient presents with decreased cervical mobility. Her lumbosacral mobility is functional but she has pain/pulling that is felt in the Left shoulder blade and low back during side bending and rotation. She does have some decreased strength in the shoulders and hips. Patient has muscle spasms and trigger points in the cervical region, paraspinals throughout the Cervical, thoracic, and lumbar spine. She has trigger points in the scapular region especially on the Left.   Patient will require Physical Therapy Intervention to address all deficits and work toward completion of all goals set. Therapist will refer patient back to MD upon completion of Therapy for further assessment and possible MRI if pain and dysfunction persist.        Short Term Goals: 4 weeks   1. Patient will be Independent with Home Exercise Program   2. Patient will demonstrate with improved Posture and Body Mechanics  3. Patient will increase Cervical Range of Motion by 10%   4. Patient will decrease complaints of pain with activity to 5/10      Long Term Goals: 8 weeks   1.  Patient will tolerate 30 minutes of activity with complaints of Pain at Less Than or Equal to 3/10     Sheila Is progressing well towards her goals.   Patient prognosis is Good.     Patient will continue to benefit from skilled outpatient physical therapy to address the deficits listed in the problem list box on initial evaluation, provide pt/family education and to maximize pt's level of independence in the home and community environment.     Patient's spiritual, cultural and educational needs considered and pt agreeable to plan of care and goals.     Anticipated barriers to physical therapy: None    Goals: Short Term Goals: 4 weeks   1. Patient will be Independent with Home Exercise Program MET  2. Patient will demonstrate with improved Posture and Body Mechanics MET  3. Patient will increase Cervical Range of Motion by 10%  MET  4. Patient will decrease complaints of pain with activity to 5/10  MET     Long Term Goals: 8 weeks   1. Patient will tolerate 30 minutes of activity with complaints of Pain at Less Than or Equal to 3/10 ONGOING    Reasons for Recertification of Therapy: Patient has been out of town and we need to extend her dates of service to continue to work toward all goals set.     Plan     Updated Plan of care Certification: 9/24/2024 to 11/8/2024.     Outpatient Physical Therapy 2 times weekly for 6 weeks to include the following interventions: 75970 [therapeutic exercise], 59946 [neuromuscular re-education], 10893 [manual therapy], and 25279 [therapeutic activities]    ADAM GARZA, PT, DPT      9/24/2024

## 2024-09-26 NOTE — PROGRESS NOTES
[facility]   Physical Therapy Treatment Note      Name: Sheila Ortiz  Clinic Number: [id number]    Therapy Diagnosis:   Encounter Diagnoses   Name Primary?    Neck pain Yes    Acute bilateral low back pain without sciatica        Physician: David Tay MD    Visit Date: 9/24/2024    Therapy Diagnosis: Neck and Back Pain    Physician: David Tay MD     Physician Orders: PT Eval and Treat   Medical Diagnosis from Referral: Neck and Back Pain   Evaluation Date: 7/25/2024  Authorization Period Expiration: 7/16/2025   Plan of Care Expiration: 11/8/2024   Visit # / Visits authorized: 11 / 17   PTA Visit #: 2/5   FOTO: 34/100 ; 48/100 on 8/27/2024     Precautions: Pacemaker      Time In: 9:50 am  Time Out: 10:00 am  Total Appointment Time (timed & untimed codes): 10 minutes     Subjective     Patient reports: been on the computer a lot so she is a little sore. 5/10 pain today. No pain meds this morning. Will be out of town until 9/21 and will return to therapy the week of 9/23.   She was issued HEP on 7/30/2024  Response to previous treatment: mild muscle soreness  Functional change: none    Pain: 5/10  Location: bilateral neck      Prior Level of Function: Independent and active   Current Level of Function: Pain in Neck and Back limit her mobility     Objective      Objective Measures updated at progress report unless specified.     Treatment     Sheila received the treatments listed below:      therapeutic exercises to develop strength, endurance, ROM, flexibility, posture, and core stabilization for 0 minutes including:  UBE  x 3 mins ea fwd and rev   Pulleys  X 2 mins ea flexion   Corner Stretch   4x20 sec   Upper Trap Stretch  2x20 sec (B)   Open Book  10x10 sec (B)   Cane Flexion off Wall      Cybex Rows - Close   see neuro below    Cybex Rows - Wide  see neuro below               Cervical Active Range of Motion/Resisted     Flexion     Extension     Side Bending Right and  Left      Rotation Right and Left      Protraction/Retraction                     manual therapy techniques: Joint mobilizations, Manual traction, and Myofacial release were applied to the: neck for  minutes, including:  MFR  Suboccipital Release  UT/SCM Stretch    neuromuscular re-education activities to improve: Kinesthetic, Proprioception, and Posture for 10 minutes. The following activities were included:          Shoulder Ext/Scap Retraction  Prone 20x5 sec hold   Bilateral Scap Retraction/Ext with Band  Green 30x   Rows High/Low  3 plates 30x each   Bilateral HA  Red 20x   Open Book  X 10 with 10 sec hold (B)   Bilateral ER  Red 20x with scapular retraction   Wall [place]                 Patient Education and Home Exercises       Education provided:   - None    Written Home Exercises Provided: Patient instructed to cont prior HEP. Exercises were reviewed and Sheila was able to demonstrate them prior to the end of the session.  Sheila demonstrated good  understanding of the education provided. See Electronic Medical Record under Patient Instructions for exercises provided during therapy sessions    Assessment     Patient has been out of town but is now back and ready to resume Therapy. Therapist performed updated Plan of Care today to extend her dates so we can continue to progress her toward all goals set. She still has pain and stiffness in the neck rated at 5/10. We plan to continue to work with her on increasing strength of postural muscles and stretching of the anterior chain in order to provide better cervical and thoracic alignment. Sup Visit performed today with BRONSON Wilson, BRONSON Rothman, and BRONSON Bethea.  All goals and treatment plan reviewed. Will work toward completion of all goals set.        PMH:  Sheila is a 69 y.o. female referred to outpatient Physical Therapy with a medical diagnosis of Neck and Back Pain. Sheila reports:  Patient saw Dr Tay on 7/16/2024. She reported to  him that she has been having neck and back pain that is worsening. X Rays were performed. Cervical X Rays showed degenerative disc changes at C5-6 manifested by anterior osteophytes and generative facet changes from C3 through C6. Lumbar X Rays showed degenerative changes at L3-4, L4-5 and L5-S1, and degenerative facet changes are present from L4 through S1.   Reports cracking and popping in the neck. She has pain in upper neck all the way through the upper traps and between her shoulder blades. Unable to sit in a regular chair for more than a few minutes.   She reports long standing back pain. She fell in 2021 on a wet spot on the floor and this caused neck and back pain. In May of this year she was doing some work in her yard and was picking up bags of mulch and this caused severe back pain that has not gotten much better.  PMH : Takotsubo cardiomyopathy; Pacemaker   Patient presents with decreased cervical mobility. Her lumbosacral mobility is functional but she has pain/pulling that is felt in the Left shoulder blade and low back during side bending and rotation. She does have some decreased strength in the shoulders and hips. Patient has muscle spasms and trigger points in the cervical region, paraspinals throughout the Cervical, thoracic, and lumbar spine. She has trigger points in the scapular region especially on the Left.   Patient will require Physical Therapy Intervention to address all deficits and work toward completion of all goals set. Therapist will refer patient back to MD upon completion of Therapy for further assessment and possible MRI if pain and dysfunction persist.        Short Term Goals: 4 weeks   1. Patient will be Independent with Home Exercise Program   2. Patient will demonstrate with improved Posture and Body Mechanics  3. Patient will increase Cervical Range of Motion by 10%   4. Patient will decrease complaints of pain with activity to 5/10      Long Term Goals: 8 weeks   1. Patient  will tolerate 30 minutes of activity with complaints of Pain at Less Than or Equal to 3/10     Sheila Is progressing well towards her goals.   Patient prognosis is Good.     Patient will continue to benefit from skilled outpatient physical therapy to address the deficits listed in the problem list box on initial evaluation, provide pt/family education and to maximize pt's level of independence in the home and community environment.     Patient's spiritual, cultural and educational needs considered and pt agreeable to plan of care and goals.     Anticipated barriers to physical therapy: None    Goals: Short Term Goals: 4 weeks   1. Patient will be Independent with Home Exercise Program MET  2. Patient will demonstrate with improved Posture and Body Mechanics MET  3. Patient will increase Cervical Range of Motion by 10%  MET  4. Patient will decrease complaints of pain with activity to 5/10  MET     Long Term Goals: 8 weeks   1. Patient will tolerate 30 minutes of activity with complaints of Pain at Less Than or Equal to 3/10 ONGOING    Plan     Updated Plan of care Certification: 9/24/2024 to 11/8/2024.     Outpatient Physical Therapy 2 times weekly for 6 weeks to include the following interventions: 03216 [therapeutic exercise], 11201 [neuromuscular re-education], 35610 [manual therapy], and 37479 [therapeutic activities]    ADAM GARZA, PT, DPT      9/24/2024

## 2024-10-01 ENCOUNTER — CLINICAL SUPPORT (OUTPATIENT)
Dept: REHABILITATION | Facility: HOSPITAL | Age: 69
End: 2024-10-01
Payer: COMMERCIAL

## 2024-10-01 DIAGNOSIS — M54.50 ACUTE BILATERAL LOW BACK PAIN WITHOUT SCIATICA: ICD-10-CM

## 2024-10-01 DIAGNOSIS — M54.2 NECK PAIN: Primary | ICD-10-CM

## 2024-10-01 PROCEDURE — 97140 MANUAL THERAPY 1/> REGIONS: CPT

## 2024-10-01 PROCEDURE — 97110 THERAPEUTIC EXERCISES: CPT

## 2024-10-01 PROCEDURE — 97112 NEUROMUSCULAR REEDUCATION: CPT

## 2024-10-01 NOTE — PROGRESS NOTES
[facility]   Physical Therapy Treatment Note      Name: Sheila Ortiz  Clinic Number: [id number]    Therapy Diagnosis:   Encounter Diagnoses   Name Primary?    Neck pain Yes    Acute bilateral low back pain without sciatica        Physician: David Tay MD    Visit Date: 10/1/2024    Therapy Diagnosis: Neck and Back Pain    Physician: David Tay MD     Physician Orders: PT Eval and Treat   Medical Diagnosis from Referral: Neck and Back Pain   Evaluation Date: 7/25/2024  Authorization Period Expiration: 7/16/2025   Plan of Care Expiration: 11/8/2024   Visit # / Visits authorized: 12 / 17   PTA Visit #: 2/5   FOTO: 34/100 ; 48/100 on 8/27/2024     Precautions: Pacemaker      Time In: 10:01 am  Time Out: 10:50 am  Total Appointment Time (timed & untimed codes): 49 minutes     Subjective     Patient reports: patient presents to therapy today with increased complaints of pain in her neck and back.   She was issued HEP on 7/30/2024  Response to previous treatment: mild muscle soreness  Functional change: none    Pain: 8-9/10  Location: bilateral neck      Prior Level of Function: Independent and active   Current Level of Function: Pain in Neck and Back limit her mobility     Objective      Objective Measures updated at progress report unless specified.     Treatment     Sheila received the treatments listed below:      therapeutic exercises to develop strength, endurance, ROM, flexibility, posture, and core stabilization for 9 minutes including:  UBE  x 3 mins ea fwd and rev   Pulleys  X 2 mins ea flexion   Corner Stretch   4x20 sec   Upper Trap Stretch  2x20 sec (B)   Open Book  10x10 sec (B)   Cane Flexion off Wall      Cybex Rows - Close   see neuro below    Cybex Rows - Wide  see neuro below               Cervical Active Range of Motion/Resisted     Flexion     Extension     Side Bending Right and Left      Rotation Right and Left      Protraction/Retraction                      manual therapy techniques: Joint mobilizations, Manual traction, and Myofacial release were applied to the: neck for 25 minutes, including:  MFR  Suboccipital Release  UT/SCM Stretch    neuromuscular re-education activities to improve: Kinesthetic, Proprioception, and Posture for 15 minutes. The following activities were included:          Shoulder Ext/Scap Retraction  Prone 20x5 sec hold   Bilateral Scap Retraction/Ext with Band  Green 30x   Rows High/Low  3 plates 30x each   Bilateral HA  Red 20x   Open Book  X 10 with 10 sec hold (B)   Bilateral ER  Red 20x with scapular retraction   Wall [place]                 Patient Education and Home Exercises       Education provided:   - None    Written Home Exercises Provided: Patient instructed to cont prior HEP. Exercises were reviewed and Sheila was able to demonstrate them prior to the end of the session.  Sheila demonstrated good  understanding of the education provided. See Electronic Medical Record under Patient Instructions for exercises provided during therapy sessions    Assessment     Patient presents to therapy today with increased complaints of pain in neck and back. She is in tears and reports she only slept 2 hours last night. Therapist had her perform gentle strengthening exercises for posture, neck, and back. Most of the time was spent with therapist performing manual treatment to address muscle spasms and muscle tightness with deep tissue work and myofascial release. She did have some slight decrease in pain at end of session. Tentative plan is to discharge patient next week and refer her back to MD for further assessment and possible MRI to help determine the cause of her pain and dysfunction. Sup Visit performed today with BRONSON Wilson, BRONSON Rothman, and BRONSON Bethea.  All goals and treatment plan reviewed. Will work toward completion of all goals set.        PMH:  Sheila is a 69 y.o. female referred to outpatient Physical Therapy  with a medical diagnosis of Neck and Back Pain. Sheila reports:  Patient saw Dr Tay on 7/16/2024. She reported to him that she has been having neck and back pain that is worsening. X Rays were performed. Cervical X Rays showed degenerative disc changes at C5-6 manifested by anterior osteophytes and generative facet changes from C3 through C6. Lumbar X Rays showed degenerative changes at L3-4, L4-5 and L5-S1, and degenerative facet changes are present from L4 through S1.   Reports cracking and popping in the neck. She has pain in upper neck all the way through the upper traps and between her shoulder blades. Unable to sit in a regular chair for more than a few minutes.   She reports long standing back pain. She fell in 2021 on a wet spot on the floor and this caused neck and back pain. In May of this year she was doing some work in her yard and was picking up bags of mulch and this caused severe back pain that has not gotten much better.  PMH : Takotsubo cardiomyopathy; Pacemaker   Patient presents with decreased cervical mobility. Her lumbosacral mobility is functional but she has pain/pulling that is felt in the Left shoulder blade and low back during side bending and rotation. She does have some decreased strength in the shoulders and hips. Patient has muscle spasms and trigger points in the cervical region, paraspinals throughout the Cervical, thoracic, and lumbar spine. She has trigger points in the scapular region especially on the Left.   Patient will require Physical Therapy Intervention to address all deficits and work toward completion of all goals set. Therapist will refer patient back to MD upon completion of Therapy for further assessment and possible MRI if pain and dysfunction persist.        Short Term Goals: 4 weeks   1. Patient will be Independent with Home Exercise Program   2. Patient will demonstrate with improved Posture and Body Mechanics  3. Patient will increase Cervical Range of Motion  by 10%   4. Patient will decrease complaints of pain with activity to 5/10      Long Term Goals: 8 weeks   1. Patient will tolerate 30 minutes of activity with complaints of Pain at Less Than or Equal to 3/10     Sheila Is progressing well towards her goals.   Patient prognosis is Good.     Patient will continue to benefit from skilled outpatient physical therapy to address the deficits listed in the problem list box on initial evaluation, provide pt/family education and to maximize pt's level of independence in the home and community environment.     Patient's spiritual, cultural and educational needs considered and pt agreeable to plan of care and goals.     Anticipated barriers to physical therapy: None    Goals: Short Term Goals: 4 weeks   1. Patient will be Independent with Home Exercise Program MET  2. Patient will demonstrate with improved Posture and Body Mechanics MET  3. Patient will increase Cervical Range of Motion by 10%  MET  4. Patient will decrease complaints of pain with activity to 5/10  MET     Long Term Goals: 8 weeks   1. Patient will tolerate 30 minutes of activity with complaints of Pain at Less Than or Equal to 3/10 ONGOING    Plan     Updated Plan of care Certification: 9/24/2024 to 11/8/2024.     Outpatient Physical Therapy 2 times weekly for 6 weeks to include the following interventions: 32182 [therapeutic exercise], 38837 [neuromuscular re-education], 26525 [manual therapy], and 66392 [therapeutic activities]    ADAM GARZA, PT, DPT      10/1/2024

## 2024-10-08 ENCOUNTER — CLINICAL SUPPORT (OUTPATIENT)
Dept: REHABILITATION | Facility: HOSPITAL | Age: 69
End: 2024-10-08
Payer: COMMERCIAL

## 2024-10-08 DIAGNOSIS — M54.2 NECK PAIN: Primary | ICD-10-CM

## 2024-10-08 DIAGNOSIS — M54.50 ACUTE BILATERAL LOW BACK PAIN WITHOUT SCIATICA: ICD-10-CM

## 2024-10-08 PROCEDURE — 97110 THERAPEUTIC EXERCISES: CPT

## 2024-10-08 PROCEDURE — 97112 NEUROMUSCULAR REEDUCATION: CPT

## 2024-10-08 PROCEDURE — 97140 MANUAL THERAPY 1/> REGIONS: CPT

## 2024-10-08 NOTE — PLAN OF CARE
[facility]   Physical Therapy Discharge Summary      Name: Sheila Ortiz  Clinic Number: [id number]    Therapy Diagnosis:   Encounter Diagnoses   Name Primary?    Neck pain Yes    Acute bilateral low back pain without sciatica        Physician: David Tay MD    Visit Date: 10/8/2024    Therapy Diagnosis: Neck and Back Pain    Physician: David Tay MD     Physician Orders: PT Eval and Treat   Medical Diagnosis from Referral: Neck and Back Pain   Evaluation Date: 7/25/2024  Authorization Period Expiration: 7/16/2025   Plan of Care Expiration: 11/8/2024   Visit # / Visits authorized: 13 / 17   PTA Visit #: 2/5   FOTO: 34/100 ; 48/100 on 8/27/2024; 46/100 on 10/8/2024     Precautions: Pacemaker      Time In: 9:58 am  Time Out: 10:50 am  Total Appointment Time (timed & untimed codes): 50 minutes     Subjective     Patient reports: patient reports that the last treatment session really helped with her pain.   She was issued HEP on 7/30/2024  Response to previous treatment: mild muscle soreness  Functional change: none    Pain: 4/10  Location: bilateral neck      Prior Level of Function: Independent and active   Current Level of Function: Pain in Neck and Back limit her mobility     Objective      Objective Measures updated at progress report unless specified.     Treatment     Sheila received the treatments listed below:      Reviewed her Home Exercise Program and performed the following :     therapeutic exercises to develop strength, endurance, ROM, flexibility, posture, and core stabilization for 12 minutes including:  UBE  x 3 mins ea fwd and rev   Pulleys  X 2 mins ea flexion   Corner Stretch   4x20 sec   Upper Trap Stretch  2x20 sec (B)   Open Book  10x10 sec (B)   Cane Flexion off Wall      Cybex Rows - Close   see neuro below    Cybex Rows - Wide  see neuro below               Cervical Active Range of Motion/Resisted     Flexion     Extension     Side Bending Right and  Left      Rotation Right and Left      Protraction/Retraction                     manual therapy techniques: Joint mobilizations, Manual traction, and Myofacial release were applied to the: neck for 23 minutes, including:  MFR  Suboccipital Release  UT/SCM Stretch    neuromuscular re-education activities to improve: Kinesthetic, Proprioception, and Posture for 15 minutes. The following activities were included:          Shoulder Ext/Scap Retraction  Prone 20x5 sec hold   Bilateral Scap Retraction/Ext with Band  Green 30x   Rows High/Low  3 plates 30x each   Bilateral HA  Red 20x   Open Book  X 10 with 10 sec hold (B)   Bilateral ER  Red 20x with scapular retraction   Wall [place]                 Patient Education and Home Exercises       Education provided:   - Therapist upgraded patient's Home Exercise Program today in preparation for Discharge from Therapy. Patient was given a written copy of the Home Exercise Program with pictures and written instructions for each exercise.  Instructed patient on proper form for all exercises and had patient return demonstration.       Written Home Exercises Provided: Patient instructed to cont prior HEP. Exercises were reviewed and Sheila was able to demonstrate them prior to the end of the session.  Sheila demonstrated good  understanding of the education provided. See Electronic Medical Record under Patient Instructions for exercises provided during therapy sessions    Assessment     Patient received Physical Therapy for 13 visits. She reports that the manual therapy along with the postural strengthening exercises has worked well to decrease her overall pain, however, she still reports pain in neck and back. She was able to work out in the yard this weekend with no increase in pain which she feels is a big improvement. Therapist upgraded patient's Home Exercise Program today in preparation for Discharge from Therapy. Patient was given a written copy of the Home Exercise Program  with pictures and written instructions for each exercise.  Instructed patient on proper form for all exercises and had patient return demonstration.   Feel patient has reached the max benefit from Therapy intervention at this time and patient is discharged from Therapy with all short term goals met but long term goal not met due to continued pain. Therapist is referring patient back to MD for further assessment, MRI, and/or any other treatment options that may provide patient with some relief for her pain.             PMH:  Sheila is a 69 y.o. female referred to outpatient Physical Therapy with a medical diagnosis of Neck and Back Pain. Sheila reports:  Patient saw Dr Tay on 7/16/2024. She reported to him that she has been having neck and back pain that is worsening. X Rays were performed. Cervical X Rays showed degenerative disc changes at C5-6 manifested by anterior osteophytes and generative facet changes from C3 through C6. Lumbar X Rays showed degenerative changes at L3-4, L4-5 and L5-S1, and degenerative facet changes are present from L4 through S1.   Reports cracking and popping in the neck. She has pain in upper neck all the way through the upper traps and between her shoulder blades. Unable to sit in a regular chair for more than a few minutes.   She reports long standing back pain. She fell in 2021 on a wet spot on the floor and this caused neck and back pain. In May of this year she was doing some work in her yard and was picking up bags of mulch and this caused severe back pain that has not gotten much better.  PMH : Takotsubo cardiomyopathy; Pacemaker   Patient presents with decreased cervical mobility. Her lumbosacral mobility is functional but she has pain/pulling that is felt in the Left shoulder blade and low back during side bending and rotation. She does have some decreased strength in the shoulders and hips. Patient has muscle spasms and trigger points in the cervical region, paraspinals  throughout the Cervical, thoracic, and lumbar spine. She has trigger points in the scapular region especially on the Left.   Patient will require Physical Therapy Intervention to address all deficits and work toward completion of all goals set. Therapist will refer patient back to MD upon completion of Therapy for further assessment and possible MRI if pain and dysfunction persist.        Short Term Goals: 4 weeks   1. Patient will be Independent with Home Exercise Program - Goal Met   2. Patient will demonstrate with improved Posture and Body Mechanics - Goal Met   3. Patient will increase Cervical Range of Motion by 10% - Goal Met   4. Patient will decrease complaints of pain with activity to 5/10 - Goal Met      Long Term Goals: 8 weeks   1. Patient will tolerate 30 minutes of activity with complaints of Pain at Less Than or Equal to 3/10 - Goal Not Met     Discharge reason: Patient has reached the maximum rehab potential for the present time    Plan   This patient is discharged from Physical Therapy.    Date of Last visit: 10/8/2024   Total Visits Received: 13  Cancelled Visits: 0  No Show Visits: 0    ADAM H KAYLA, PT, DPT

## 2024-10-08 NOTE — PROGRESS NOTES
[facility]   Physical Therapy Treatment Note      Name: Sheila Ortiz  Clinic Number: [id number]    Therapy Diagnosis:   Encounter Diagnoses   Name Primary?    Neck pain Yes    Acute bilateral low back pain without sciatica        Physician: David Tay MD    Visit Date: 10/8/2024    Therapy Diagnosis: Neck and Back Pain    Physician: David Tay MD     Physician Orders: PT Eval and Treat   Medical Diagnosis from Referral: Neck and Back Pain   Evaluation Date: 7/25/2024  Authorization Period Expiration: 7/16/2025   Plan of Care Expiration: 11/8/2024   Visit # / Visits authorized: 13 / 17   PTA Visit #: 2/5   FOTO: 34/100 ; 48/100 on 8/27/2024; 46/100 on 10/8/2024     Precautions: Pacemaker      Time In: 9:58 am  Time Out: 10:50 am  Total Appointment Time (timed & untimed codes): 50 minutes     Subjective     Patient reports: patient reports that the last treatment session really helped with her pain.   She was issued HEP on 7/30/2024  Response to previous treatment: mild muscle soreness  Functional change: none    Pain: 4/10  Location: bilateral neck      Prior Level of Function: Independent and active   Current Level of Function: Pain in Neck and Back limit her mobility     Objective      Objective Measures updated at progress report unless specified.     Treatment     Sheila received the treatments listed below:      Reviewed her Home Exercise Program and performed the following :     therapeutic exercises to develop strength, endurance, ROM, flexibility, posture, and core stabilization for 12 minutes including:  UBE  x 3 mins ea fwd and rev   Pulleys  X 2 mins ea flexion   Corner Stretch   4x20 sec   Upper Trap Stretch  2x20 sec (B)   Open Book  10x10 sec (B)   Cane Flexion off Wall      Cybex Rows - Close   see neuro below    Cybex Rows - Wide  see neuro below               Cervical Active Range of Motion/Resisted     Flexion     Extension     Side Bending Right and Left       Rotation Right and Left      Protraction/Retraction                     manual therapy techniques: Joint mobilizations, Manual traction, and Myofacial release were applied to the: neck for 23 minutes, including:  MFR  Suboccipital Release  UT/SCM Stretch    neuromuscular re-education activities to improve: Kinesthetic, Proprioception, and Posture for 15 minutes. The following activities were included:          Shoulder Ext/Scap Retraction  Prone 20x5 sec hold   Bilateral Scap Retraction/Ext with Band  Green 30x   Rows High/Low  3 plates 30x each   Bilateral HA  Red 20x   Open Book  X 10 with 10 sec hold (B)   Bilateral ER  Red 20x with scapular retraction   Wall [place]                 Patient Education and Home Exercises       Education provided:   - Therapist upgraded patient's Home Exercise Program today in preparation for Discharge from Therapy. Patient was given a written copy of the Home Exercise Program with pictures and written instructions for each exercise.  Instructed patient on proper form for all exercises and had patient return demonstration.       Written Home Exercises Provided: Patient instructed to cont prior HEP. Exercises were reviewed and Sheila was able to demonstrate them prior to the end of the session.  Sehila demonstrated good  understanding of the education provided. See Electronic Medical Record under Patient Instructions for exercises provided during therapy sessions    Assessment     Patient received Physical Therapy for 13 visits. She reports that the manual therapy along with the postural strengthening exercises has worked well to decrease her overall pain, however, she still reports pain in neck and back. She was able to work out in the yard this weekend with no increase in pain which she feels is a big improvement. Therapist upgraded patient's Home Exercise Program today in preparation for Discharge from Therapy. Patient was given a written copy of the Home Exercise Program with  pictures and written instructions for each exercise.  Instructed patient on proper form for all exercises and had patient return demonstration.   Feel patient has reached the max benefit from Therapy intervention at this time and patient is discharged from Therapy with all short term goals met but long term goal not met due to continued pain. Therapist is referring patient back to MD for further assessment, MRI, and/or any other treatment options that may provide patient with some relief for her pain.             PMH:  Sheila is a 69 y.o. female referred to outpatient Physical Therapy with a medical diagnosis of Neck and Back Pain. Sheila reports:  Patient saw Dr Tay on 7/16/2024. She reported to him that she has been having neck and back pain that is worsening. X Rays were performed. Cervical X Rays showed degenerative disc changes at C5-6 manifested by anterior osteophytes and generative facet changes from C3 through C6. Lumbar X Rays showed degenerative changes at L3-4, L4-5 and L5-S1, and degenerative facet changes are present from L4 through S1.   Reports cracking and popping in the neck. She has pain in upper neck all the way through the upper traps and between her shoulder blades. Unable to sit in a regular chair for more than a few minutes.   She reports long standing back pain. She fell in 2021 on a wet spot on the floor and this caused neck and back pain. In May of this year she was doing some work in her yard and was picking up bags of mulch and this caused severe back pain that has not gotten much better.  PMH : Takotsubo cardiomyopathy; Pacemaker   Patient presents with decreased cervical mobility. Her lumbosacral mobility is functional but she has pain/pulling that is felt in the Left shoulder blade and low back during side bending and rotation. She does have some decreased strength in the shoulders and hips. Patient has muscle spasms and trigger points in the cervical region, paraspinals  throughout the Cervical, thoracic, and lumbar spine. She has trigger points in the scapular region especially on the Left.   Patient will require Physical Therapy Intervention to address all deficits and work toward completion of all goals set. Therapist will refer patient back to MD upon completion of Therapy for further assessment and possible MRI if pain and dysfunction persist.        Short Term Goals: 4 weeks   1. Patient will be Independent with Home Exercise Program - Goal Met   2. Patient will demonstrate with improved Posture and Body Mechanics - Goal Met   3. Patient will increase Cervical Range of Motion by 10% - Goal Met   4. Patient will decrease complaints of pain with activity to 5/10 - Goal Met      Long Term Goals: 8 weeks   1. Patient will tolerate 30 minutes of activity with complaints of Pain at Less Than or Equal to 3/10 - Goal Not Met         Plan     Patient to be Discharged from Physical Therapy services following today's treatment. See Discharge Summary if needed.       ADAM GARZA, PT, DPT      10/8/2024

## 2024-10-22 ENCOUNTER — OFFICE VISIT (OUTPATIENT)
Dept: FAMILY MEDICINE | Facility: CLINIC | Age: 69
End: 2024-10-22
Payer: COMMERCIAL

## 2024-10-22 ENCOUNTER — PATIENT OUTREACH (OUTPATIENT)
Facility: HOSPITAL | Age: 69
End: 2024-10-22
Payer: COMMERCIAL

## 2024-10-22 VITALS
BODY MASS INDEX: 33.87 KG/M2 | OXYGEN SATURATION: 96 % | SYSTOLIC BLOOD PRESSURE: 136 MMHG | WEIGHT: 161.38 LBS | HEIGHT: 58 IN | DIASTOLIC BLOOD PRESSURE: 84 MMHG | TEMPERATURE: 98 F | RESPIRATION RATE: 20 BRPM | HEART RATE: 69 BPM

## 2024-10-22 DIAGNOSIS — Z13.1 SCREENING FOR DIABETES MELLITUS: Primary | ICD-10-CM

## 2024-10-22 DIAGNOSIS — Z00.00 ROUTINE GENERAL MEDICAL EXAMINATION AT A HEALTH CARE FACILITY: Primary | ICD-10-CM

## 2024-10-22 DIAGNOSIS — Z12.31 ENCOUNTER FOR SCREENING MAMMOGRAM FOR MALIGNANT NEOPLASM OF BREAST: ICD-10-CM

## 2024-10-22 DIAGNOSIS — Z13.220 SCREENING FOR LIPOID DISORDERS: ICD-10-CM

## 2024-10-22 DIAGNOSIS — Z13.820 ENCOUNTER FOR OSTEOPOROSIS SCREENING IN ASYMPTOMATIC POSTMENOPAUSAL PATIENT: ICD-10-CM

## 2024-10-22 DIAGNOSIS — M50.30 DEGENERATIVE DISC DISEASE, CERVICAL: ICD-10-CM

## 2024-10-22 DIAGNOSIS — Z12.31 SCREENING MAMMOGRAM FOR BREAST CANCER: ICD-10-CM

## 2024-10-22 DIAGNOSIS — R60.0 EDEMA OF LEG: ICD-10-CM

## 2024-10-22 DIAGNOSIS — Z13.1 SCREENING FOR DIABETES MELLITUS: ICD-10-CM

## 2024-10-22 DIAGNOSIS — Z78.0 ENCOUNTER FOR OSTEOPOROSIS SCREENING IN ASYMPTOMATIC POSTMENOPAUSAL PATIENT: ICD-10-CM

## 2024-10-22 LAB
CHOLEST SERPL-MCNC: 247 MG/DL (ref 0–200)
CHOLEST/HDLC SERPL: 2.6 {RATIO}
GLUCOSE SERPL-MCNC: 126 MG/DL (ref 74–106)
HDLC SERPL-MCNC: 95 MG/DL (ref 40–60)
LDLC SERPL CALC-MCNC: 146 MG/DL
LDLC/HDLC SERPL: 1.5 {RATIO}
NONHDLC SERPL-MCNC: 152 MG/DL
TRIGL SERPL-MCNC: 31 MG/DL (ref 35–150)
VLDLC SERPL-MCNC: 6 MG/DL

## 2024-10-22 PROCEDURE — 83036 HEMOGLOBIN GLYCOSYLATED A1C: CPT | Mod: ,,, | Performed by: CLINICAL MEDICAL LABORATORY

## 2024-10-22 PROCEDURE — 1159F MED LIST DOCD IN RCRD: CPT | Mod: ,,, | Performed by: FAMILY MEDICINE

## 2024-10-22 PROCEDURE — 1125F AMNT PAIN NOTED PAIN PRSNT: CPT | Mod: ,,, | Performed by: FAMILY MEDICINE

## 2024-10-22 PROCEDURE — 99397 PER PM REEVAL EST PAT 65+ YR: CPT | Mod: ,,, | Performed by: FAMILY MEDICINE

## 2024-10-22 PROCEDURE — 1160F RVW MEDS BY RX/DR IN RCRD: CPT | Mod: ,,, | Performed by: FAMILY MEDICINE

## 2024-10-22 PROCEDURE — 1100F PTFALLS ASSESS-DOCD GE2>/YR: CPT | Mod: ,,, | Performed by: FAMILY MEDICINE

## 2024-10-22 PROCEDURE — 80061 LIPID PANEL: CPT | Mod: ,,, | Performed by: CLINICAL MEDICAL LABORATORY

## 2024-10-22 PROCEDURE — 82947 ASSAY GLUCOSE BLOOD QUANT: CPT | Mod: ,,, | Performed by: CLINICAL MEDICAL LABORATORY

## 2024-10-22 PROCEDURE — 3044F HG A1C LEVEL LT 7.0%: CPT | Mod: ,,, | Performed by: FAMILY MEDICINE

## 2024-10-22 PROCEDURE — 3079F DIAST BP 80-89 MM HG: CPT | Mod: ,,, | Performed by: FAMILY MEDICINE

## 2024-10-22 PROCEDURE — 3075F SYST BP GE 130 - 139MM HG: CPT | Mod: ,,, | Performed by: FAMILY MEDICINE

## 2024-10-22 PROCEDURE — 3008F BODY MASS INDEX DOCD: CPT | Mod: ,,, | Performed by: FAMILY MEDICINE

## 2024-10-22 PROCEDURE — 3288F FALL RISK ASSESSMENT DOCD: CPT | Mod: ,,, | Performed by: FAMILY MEDICINE

## 2024-10-22 RX ORDER — HYDROCHLOROTHIAZIDE 25 MG/1
25 TABLET ORAL DAILY
Qty: 30 TABLET | Refills: 5 | Status: SHIPPED | OUTPATIENT
Start: 2024-10-22

## 2024-10-22 NOTE — PROGRESS NOTES
Population Health Chart Review & Patient Outreach Details      Further Action Needed If Patient Returns Outreach:            Updates Requested / Reviewed:     []  Care Everywhere    []     []  External Sources ([facility], [facility], [facility], etc.)    [] [facility]   [] [facility]   [] Other:    []  Care Team Updated   []  Removed  or Duplicate Orders   []  Immunization Reconciliation Completed / Queried    [] [place]   [] [place]   [] [place]   [] [place]      Health Maintenance Topics Addressed and Outreach Outcomes / Actions Taken:             Breast Cancer Screening []  Mammogram Order Placed    []  Mammogram Screening Scheduled    []  External Records Requested & Care Team Updated if Applicable    []  External Records Uploaded & Care Team Updated if Applicable    []  Pt Declined Scheduling Mammogram    []  Pt Will Schedule with External Provider / Order Routed & Care Team Updated if Applicable              Cervical Cancer Screening []  Pap Smear Scheduled in Primary Care or OBGYN    []  External Records Requested & Care Team Updated if Applicable       []  External Records Uploaded, Care Team Updated, & History Updated if Applicable    []  Patient Declined Scheduling Pap Smear    []  Patient Will Schedule with External Provider & Care Team Updated if Applicable                  Colorectal Cancer Screening []  Colonoscopy Case Request / Referral / Home Test Order Placed    []  External Records Requested & Care Team Updated if Applicable    []  External Records Uploaded, Care Team Updated, & History Updated if Applicable    []  Patient Declined Completing Colon Cancer Screening    []  Patient Will Schedule with External Provider & Care Team Updated if Applicable    []  Fit Kit Mailed (add the SmartPhrase under additional notes)    []  Reminded Patient to Complete Home Test                Diabetic Eye Exam []  Eye Exam Screening Order Placed    []  Eye Camera Scheduled or Optometry/Ophthalmology Referral  Placed    []  External Records Requested & Care Team Updated if Applicable    []  External Records Uploaded, Care Team Updated, & History Updated if Applicable    []  Patient Declined Scheduling Eye Exam    []  Patient Will Schedule with External Provider & Care Team Updated if Applicable             Blood Pressure Control []  Primary Care Follow Up Visit Scheduled     []  Remote Blood Pressure Reading Captured    []  Patient Declined Remote Reading or Scheduling Appt - Escalated to PCP    []  Patient Will Call Back or Send Portal Message with Reading                 HbA1c & Other Labs []  Overdue Lab(s) Ordered    []  Overdue Lab(s) Scheduled    []  External Records Uploaded & Care Team Updated if Applicable    []  Primary Care Follow Up Visit Scheduled     []  Reminded Patient to Complete A1c Home Test    []  Patient Declined Scheduling Labs or Will Call Back to Schedule    []  Patient Will Schedule with External Provider / Order Routed, & Care Team Updated if Applicable           Primary Care Appointment []  Primary Care Appt Scheduled    []  Patient Declined Scheduling or Will Call Back to Schedule    []  Pt Established with External Provider, Updated Care Team, & Informed Pt to Notify Payor if Applicable           Medication Adherence /    Statin Use []  Primary Care Appointment Scheduled    []  Patient Reminded to  Prescription    []  Patient Declined, Provider Notified if Needed    []  Sent Provider Message to Review to Evaluate Pt for Statin, Add Exclusion Dx Codes, Document   Exclusion in Problem List, Change Statin Intensity Level to Moderate or High Intensity if Applicable                Osteoporosis Screening []  Dexa Order Placed    []  Dexa Appointment Scheduled    []  External Records Requested & Care Team Updated    []  External Records Uploaded, Care Team Updated, & History Updated if Applicable    []  Patient Declined Scheduling Dexa or Will Call Back to Schedule    []  Patient Will Schedule  with External Provider / Order Routed & Care Team Updated if Applicable       Additional Notes:.  Post visit Population Health review of encounter with date of service  10/22/24 with [email or web address]   All required HY components in encounter.Needs an A1c message sent to nurse and  via secure chat.    Followup appt for: 4/15/25 HY

## 2024-10-22 NOTE — PROGRESS NOTES
Sheila Ortiz is a 69 y.o. female seen today for her healthy you.  She does need her DEXA scan and mammogram ordered.  She is up-to-date on her colonoscopy screening.  She has had no chest pain or shortness for breath and is active in meeting her ADLs despite chronic neck pain due to cervical disc disease.  She has been treated by physical therapist who recommended an MRI of the C-spine.  The patient did score high on her PHQ-9 today secondary to her chronic pain syndrome.  Already she is treated by mental health but we discussed a pain management evaluation to help control her pain.  Patient is also having intense carpal and pedal spasms which may be related to her cervical disc disease.  An MRI has been ordered.      Past Medical History:   Diagnosis Date    Diabetes mellitus, type 2     Hypothyroidism      No family history on file.  Current Outpatient Medications on File Prior to Visit   Medication Sig Dispense Refill    ALPRAZolam (XANAX) 0.5 MG tablet Take 0.5 mg by mouth as needed.      cyclobenzaprine (FLEXERIL) 10 MG tablet Take 10 mg by mouth Daily.      diltiaZEM (CARDIZEM CD) 120 MG Cp24 Take 120 mg by mouth once daily. At night      donepeziL (ARICEPT) 10 MG tablet Take 10 mg by mouth every morning.      levothyroxine (SYNTHROID) 25 MCG tablet Take 25 mcg by mouth before breakfast.      prazosin (MINIPRESS) 1 MG Cap Take 1 mg by mouth every evening.      rosuvastatin (CRESTOR) 10 MG tablet Take 10 mg by mouth.      sertraline (ZOLOFT) 100 MG tablet Take 50 mg by mouth every evening.      traZODone (DESYREL) 150 MG tablet Take 150 mg by mouth nightly as needed.      [DISCONTINUED] dextroamphetamine-amphetamine (ADDERALL) 20 mg tablet Take 1 tablet by mouth once daily.      [DISCONTINUED] hydroCHLOROthiazide (HYDRODIURIL) 25 MG tablet Take 25 mg by mouth once daily.      [DISCONTINUED] dextroamphetamine-amphetamine 10 mg Tab Take 1 tablet by mouth every morning. (Patient not taking: Reported on 10/22/2024)       [DISCONTINUED] furosemide (LASIX) 20 MG tablet Take 20 mg by mouth daily as needed. (Patient not taking: Reported on 10/22/2024)       No current facility-administered medications on file prior to visit.       There is no immunization history on file for this patient.    Review of Systems   Constitutional:  Negative for fever, malaise/fatigue and weight loss.   Respiratory:  Negative for shortness of breath.    Cardiovascular:  Negative for chest pain and palpitations.   Gastrointestinal:  Negative for nausea and vomiting.   Musculoskeletal:  Positive for myalgias and neck pain.   Neurological:  Positive for sensory change.   Psychiatric/Behavioral:  Positive for depression. The patient is nervous/anxious.         Vitals:    10/22/24 0939   BP: 136/84   Pulse: 69   Resp: 20   Temp: 98 °F (36.7 °C)       Physical Exam  Vitals reviewed.   Constitutional:       Appearance: Normal appearance.   HENT:      Head: Normocephalic.   Eyes:      Extraocular Movements: Extraocular movements intact.      Conjunctiva/sclera: Conjunctivae normal.      Pupils: Pupils are equal, round, and reactive to light.   Neck:      Thyroid: No thyroid mass or thyromegaly.   Cardiovascular:      Rate and Rhythm: Normal rate and regular rhythm.      Heart sounds: Normal heart sounds. No murmur heard.     No gallop.   Pulmonary:      Effort: Pulmonary effort is normal. No respiratory distress.      Breath sounds: Normal breath sounds. No wheezing or rales.   Musculoskeletal:      Cervical back: Spasms present. Decreased range of motion.        Back:    Skin:     General: Skin is warm and dry.      Coloration: Skin is not jaundiced or pale.   Neurological:      Mental Status: She is alert.   Psychiatric:         Mood and Affect: Mood normal.         Behavior: Behavior normal.         Thought Content: Thought content normal.         Judgment: Judgment normal.          Assessment and Plan  1. Screening mammogram for breast cancer  -     Mammo  Digital Screening Bilat w/ Yazan; Future; Expected date: 10/22/2024    2. Encounter for osteoporosis screening in asymptomatic postmenopausal patient  -     DXA Bone Density Axial Skeleton 1 or more sites; Future; Expected date: 10/22/2024    3. Routine general medical examination at a health care facility    4. Screening for diabetes mellitus  -     Glucose, Fasting; Future; Expected date: 10/22/2024    5. Screening for lipoid disorders  -     Lipid Panel; Future; Expected date: 10/22/2024    6. Encounter for screening mammogram for malignant neoplasm of breast    7. Degenerative disc disease, cervical  -     MRI Cervical Spine Without Contrast; Future; Expected date: 10/22/2024    8. Edema of leg  -     hydroCHLOROthiazide (HYDRODIURIL) 25 MG tablet; Take 1 tablet (25 mg total) by mouth once daily.  Dispense: 30 tablet; Refill: 5             Return to clinic after her MRI.  We will draw a chemistry and magnesium at that visit.  The patient will contact us regarding which pain management clinic she would like to attend.    Health Maintenance Topics with due status: Not Due       Topic Last Completion Date    Lipid Panel 11/06/2023    Hemoglobin A1c (Prediabetes) 05/09/2024    RSV Vaccine (Age 60+ and Pregnant patients) Not Due

## 2024-10-23 ENCOUNTER — PATIENT OUTREACH (OUTPATIENT)
Facility: HOSPITAL | Age: 69
End: 2024-10-23
Payer: COMMERCIAL

## 2024-10-23 ENCOUNTER — OFFICE VISIT (OUTPATIENT)
Dept: PAIN MEDICINE | Facility: CLINIC | Age: 69
End: 2024-10-23
Payer: COMMERCIAL

## 2024-10-23 VITALS
WEIGHT: 163 LBS | BODY MASS INDEX: 34.22 KG/M2 | SYSTOLIC BLOOD PRESSURE: 149 MMHG | HEIGHT: 58 IN | DIASTOLIC BLOOD PRESSURE: 64 MMHG | HEART RATE: 61 BPM

## 2024-10-23 DIAGNOSIS — M54.16 LUMBAR RADICULOPATHY, CHRONIC: ICD-10-CM

## 2024-10-23 DIAGNOSIS — M54.9 DORSALGIA, UNSPECIFIED: Primary | ICD-10-CM

## 2024-10-23 DIAGNOSIS — M50.30 DEGENERATIVE DISC DISEASE, CERVICAL: ICD-10-CM

## 2024-10-23 DIAGNOSIS — Z79.899 ENCOUNTER FOR LONG-TERM (CURRENT) USE OF HIGH-RISK MEDICATION: ICD-10-CM

## 2024-10-23 LAB
CTP QC/QA: YES
EST. AVERAGE GLUCOSE BLD GHB EST-MCNC: 126 MG/DL
HBA1C MFR BLD HPLC: 6 % (ref 4.5–6.6)
POC (AMP) AMPHETAMINE: NEGATIVE
POC (BAR) BARBITURATES: NEGATIVE
POC (BUP) BUPRENORPHINE: NEGATIVE
POC (BZO) BENZODIAZEPINES: ABNORMAL
POC (COC) COCAINE: NEGATIVE
POC (MDMA) METHYLENEDIOXYMETHAMPHETAMINE 3,4: NEGATIVE
POC (MET) METHAMPHETAMINE: NEGATIVE
POC (MOP) OPIATES: NEGATIVE
POC (MTD) METHADONE: NEGATIVE
POC (OXY) OXYCODONE: NEGATIVE
POC (PCP) PHENCYCLIDINE: NEGATIVE
POC (TCA) TRICYCLIC ANTIDEPRESSANTS: NEGATIVE
POC TEMPERATURE (URINE): 92
POC THC: NEGATIVE

## 2024-10-23 PROCEDURE — 1159F MED LIST DOCD IN RCRD: CPT | Mod: S$GLB,,, | Performed by: PAIN MEDICINE

## 2024-10-23 PROCEDURE — 99204 OFFICE O/P NEW MOD 45 MIN: CPT | Mod: S$GLB,,, | Performed by: PAIN MEDICINE

## 2024-10-23 PROCEDURE — 3077F SYST BP >= 140 MM HG: CPT | Mod: S$GLB,,, | Performed by: PAIN MEDICINE

## 2024-10-23 PROCEDURE — 3008F BODY MASS INDEX DOCD: CPT | Mod: S$GLB,,, | Performed by: PAIN MEDICINE

## 2024-10-23 PROCEDURE — 1100F PTFALLS ASSESS-DOCD GE2>/YR: CPT | Mod: S$GLB,,, | Performed by: PAIN MEDICINE

## 2024-10-23 PROCEDURE — 1125F AMNT PAIN NOTED PAIN PRSNT: CPT | Mod: S$GLB,,, | Performed by: PAIN MEDICINE

## 2024-10-23 PROCEDURE — 99999 PR PBB SHADOW E&M-EST. PATIENT-LVL V: CPT | Mod: PBBFAC,,, | Performed by: PAIN MEDICINE

## 2024-10-23 PROCEDURE — 3288F FALL RISK ASSESSMENT DOCD: CPT | Mod: S$GLB,,, | Performed by: PAIN MEDICINE

## 2024-10-23 PROCEDURE — 80305 DRUG TEST PRSMV DIR OPT OBS: CPT | Mod: QW,S$GLB,, | Performed by: PAIN MEDICINE

## 2024-10-23 PROCEDURE — 3078F DIAST BP <80 MM HG: CPT | Mod: S$GLB,,, | Performed by: PAIN MEDICINE

## 2024-10-23 PROCEDURE — 3044F HG A1C LEVEL LT 7.0%: CPT | Mod: S$GLB,,, | Performed by: PAIN MEDICINE

## 2024-10-23 RX ORDER — TRAMADOL HYDROCHLORIDE 50 MG/1
50 TABLET ORAL EVERY 6 HOURS PRN
Qty: 14 TABLET | Refills: 0 | Status: SHIPPED | OUTPATIENT
Start: 2024-10-23 | End: 2024-10-30

## 2024-10-23 RX ORDER — CYCLOBENZAPRINE HCL 10 MG
10 TABLET ORAL 3 TIMES DAILY PRN
Qty: 90 TABLET | Refills: 0 | Status: SHIPPED | OUTPATIENT
Start: 2024-10-23 | End: 2024-11-22

## 2024-10-23 NOTE — PATIENT INSTRUCTIONS
OUR OFFICE HAS SENT MRI REQUEST TO SCHEDULE LUMBAR MRI.  A PACEMAKER TECH WILL CALL AND GIVE A DATE AND TIME.

## 2024-10-23 NOTE — PROGRESS NOTES
Chronic Pain - New Consult    Referring Physician: David Tay MD       SUBJECTIVE: Disclaimer: This note has been generated using voice-recognition software. There may be typographical errors that have been missed during proof-reading      Initial encounter:    Sheila Ortiz presents to the clinic for the evaluation of cervical and lower back pain.       69-year-old female presents for new patient evaluation and consultation from Dr. Tay. She reports an onset of lower back pain in the 1980s.  Her pain has remained chronic over the years and often radiates to both hips.  She denies radicular symptoms to the lower extremities.  X-ray of the lumbar spine revealed L4-S1 severe degenerative changes.  She also complains of cervical and bilateral shoulder pain which is more frequent than her lower back pain and this pain has worsened over the past 6 months.  The pain was exacerbated after a fall.  She notes numbness of both hands,  fingers and contractures at least once a day.  The cervical pain is exacerbated with sitting at her computer, most ADLs, standing and bending.  She notes some relief with sitting erect medication and moist towels.  X-ray of the cervical spine revealed C5-6 severe degenerative changes.  She has a MRI of the cervical spine pending.  She completed physical therapy after 12 weeks without significant improvement.      Pain Assessment  Pain Assessment: 0-10  Pain Score:   8  Pain Location: Back  Pain Orientation: Lower  Pain Radiating Towards: Right hip mainly but L hip too. Neil sides of neck and neil shoulders  Pain Descriptors: Dull  Pain Frequency: Continuous  Pain Onset: Awakened from sleep  Aggravating Factors: Bending, Other (Comment) (sitting)  Pain Intervention(s): Medication (See eMAR), Heat applied, Cold applied      Physical Therapy/Home Exercise: yes      Pain Medications:  has a current medication list which includes the following prescription(s): alprazolam, cyclobenzaprine,  "diltiazem, donepezil, hydrochlorothiazide, levothyroxine, prazosin, rosuvastatin, sertraline, trazodone, cyclobenzaprine, and tramadol.      Tried in Past:  NSAIDS-yes  TCA-no  SNRI-no  Anti-convulsants-no  Muscle Relaxants-yes  Opioids-no  Benzodiazepines-yes     4A"s of Opioid Risk Assessment  Activity: Patient is unable to  perform  ADL  Analgesia:  Patient's pain is partially controlled by current medication.   Aberrant Behavior:  reviewed with no aberrant drug seeking/taking behavior     report:  Reviewed and consistent with medication use as prescribed.    Patient denies suicidal or homicidal ideations    Pain interventional therapy-no    Chiropractor -no  Acupuncture - no  TENS unit -no  Massage therapy-no  Spinal decompression -no  Joint replacement -no       Review of Systems   Constitutional: Negative.    HENT: Negative.     Eyes: Negative.    Respiratory: Negative.     Cardiovascular: Negative.    Gastrointestinal: Negative.    Endocrine: Negative.    Genitourinary: Negative.    Musculoskeletal:  Positive for myalgias, neck pain and neck stiffness.   Integumentary:  Negative.   Neurological: Negative.    Hematological: Negative.    Psychiatric/Behavioral: Negative.               X-Ray Lumbar Spine AP And Lateral  Narrative: EXAMINATION:  XR LUMBAR SPINE AP AND LATERAL    CLINICAL HISTORY:  Low back pain, unspecified    TECHNIQUE:  Lumbar spine, AP and lateral views    COMPARISON:  None.    FINDINGS:  No malalignment is seen.  There are degenerative changes at L3-4, L4-5 and L5-S1, and degenerative facet changes are present from L4 through S1.  No fracture or subluxation is seen.  SI joints are normal.  Impression: Degenerative changes are present at multiple levels in the lower lumbar spine.    Place of service: [facility]    Electronically signed by: Mary Ellen Otero  Date:    07/16/2024  Time:    12:02  X-Ray Cervical Spine 2 or 3 Views  Narrative: EXAMINATION:  XR CERVICAL SPINE 2 OR 3 " VIEWS    CLINICAL HISTORY:  Cervicalgia    TECHNIQUE:  Cervical spine, AP lateral and open mouth odontoid views    COMPARISON:  None.    FINDINGS:  There is straightening of the normal lordotic curve with degenerative disc changes at C5-6 manifested by anterior osteophytes.  Degenerative facet changes are present from C3 through C6.  No fracture or subluxation is seen.  Impression: Degenerative changes are present, most severe at C5-C6.    Place of service: [facility]    Electronically signed by: Mary Ellen Otero  Date:    2024  Time:    12:00         Past Medical History:   Diagnosis Date    Diabetes mellitus, type 2     Hypothyroidism      Past Surgical History:   Procedure Laterality Date     SECTION      TUBAL LIGATION       Social History     Socioeconomic History    Marital status:    Tobacco Use    Smoking status: Never     Passive exposure: Never    Smokeless tobacco: Never     Social Drivers of Health     Financial Resource Strain: Low Risk  (10/22/2024)    Overall Financial Resource Strain (CARDIA)     Difficulty of Paying Living Expenses: Not hard at all   Food Insecurity: No Food Insecurity (10/22/2024)    Hunger Vital Sign     Worried About Running Out of Food in the Last Year: Never true     Ran Out of Food in the Last Year: Never true   Physical Activity: Inactive (10/22/2024)    Exercise Vital Sign     Days of Exercise per Week: 0 days     Minutes of Exercise per Session: 0 min   Stress: Stress Concern Present (10/22/2024)    [employer] - Occupational Stress Questionnaire     Feeling of Stress : Very much   Housing Stability: Unknown (10/22/2024)    Housing Stability Vital Sign     Unable to Pay for Housing in the Last Year: No     No family history on file.  Review of patient's allergies indicates:   Allergen Reactions    Cephalexin     Clarithromycin     Codeine phosphate     Codeine sulfate     Penicillins     Sulfa (sulfonamide  "antibiotics)     Sulfamethoxazole-trimethoprim     Triple antibiotic (colistimth)     Erythromycin base Rash         OBJECTIVE:  Vitals:    10/23/24 1458   BP: (!) 149/64   Pulse: 61     BP (!) 149/64 (BP Location: Left arm)   Pulse 61   Ht 4' 10" (1.473 m)   Wt 73.9 kg (163 lb)   BMI 34.07 kg/m²   Physical Exam  Vitals and nursing note reviewed.   Constitutional:       General: She is not in acute distress.     Appearance: Normal appearance. She is not ill-appearing, toxic-appearing or diaphoretic.   HENT:      Head: Normocephalic and atraumatic.      Nose: Nose normal.      Mouth/Throat:      Mouth: Mucous membranes are moist.   Eyes:      Extraocular Movements: Extraocular movements intact.      Pupils: Pupils are equal, round, and reactive to light.   Cardiovascular:      Rate and Rhythm: Normal rate and regular rhythm.      Heart sounds: Normal heart sounds.   Pulmonary:      Effort: Pulmonary effort is normal. No respiratory distress.      Breath sounds: Normal breath sounds. No stridor. No wheezing or rhonchi.   Abdominal:      General: Bowel sounds are normal.      Palpations: Abdomen is soft.   Musculoskeletal:         General: No swelling or deformity.      Cervical back: Spasms and tenderness present. Pain with movement present. Decreased range of motion.      Thoracic back: Normal.      Lumbar back: Spasms, tenderness and bony tenderness present. Decreased range of motion. Negative right straight leg raise test and negative left straight leg raise test. No scoliosis.      Right lower leg: No edema.      Left lower leg: No edema.      Comments: Cervical pain with flexion extension lateral rotation.  Bilateral cervical facet tenderness to palpation.  Lumbar pain with flexion extension lateral rotation.  Lumbar spinous and paraspinous process tenderness to palpation from L3-S1.   Skin:     General: Skin is warm.   Neurological:      General: No focal deficit present.      Mental Status: She is alert and " oriented to person, place, and time. Mental status is at baseline.      Cranial Nerves: No cranial nerve deficit.      Sensory: Sensation is intact. No sensory deficit.      Motor: No weakness.      Coordination: Coordination normal.      Gait: Gait normal.      Deep Tendon Reflexes: Reflexes are normal and symmetric.      Reflex Scores:       Tricep reflexes are 2+ on the right side and 2+ on the left side.       Bicep reflexes are 2+ on the right side and 2+ on the left side.       Patellar reflexes are 2+ on the right side and 2+ on the left side.       Achilles reflexes are 2+ on the right side and 2+ on the left side.  Psychiatric:         Mood and Affect: Mood is anxious.         Speech: Speech is rapid and pressured.         Behavior: Behavior normal.            ASSESSMENT: 69 y.o. year old female with pain, consistent with     Encounter Diagnoses   Name Primary?    Degenerative disc disease, cervical     Dorsalgia, unspecified Yes    Lumbar radiculopathy, chronic     Encounter for long-term (current) use of high-risk medication         PLAN:   1. reviewed  2..Addiction, Dependency, Tolerance, Opioid abuse-misuse, Death, Diversion Discussed. Overdose reversal drug Naloxone discussed.Patient is prescribed opiates for chronic nonmalignant pain pathology.  Patient is receiving opiates which require greater than a 72 hour supply of therapy.  Patient was educated on potential dependency associated with long-term opioid use as well as decreasing efficacy with prolonged use.  Patient was advised of risks, benefits and side effects and how to utilize each medication.  Patient was also informed that any deviation from therapy protocol will  lead to discontinuation of opiates.  It is reasonable to prescribe opioid analgesics for patient based on positive response to opioid medications, lack of side effects and  limited aberrant behavior.    3. Opioid contract signed today  4.Refill/ Continue medications for pain  control and function       Requested Prescriptions     Signed Prescriptions Disp Refills    cyclobenzaprine (FLEXERIL) 10 MG tablet 90 tablet 0     Sig: Take 1 tablet (10 mg total) by mouth 3 (three) times daily as needed for Muscle spasms.    traMADoL (ULTRAM) 50 mg tablet 14 tablet 0     Sig: Take 1 tablet (50 mg total) by mouth every 6 (six) hours as needed for Pain.     5. MRI of cervical spine is pending. I will add a lumbar MRI ro rule out disc pathology  6.Urine drug screen and confirmation testing was ordered as documented on the requisition form in order to monitor for compliance with prescribed opiates and to ensure that there was no misuse/abuse of other non-prescribed opiates or illicit drugs.  I will determine if the patient is suitable for continuation of opioid therapy after obtaining  the definitive urine drug screen for confirmation.    Orders Placed This Encounter   Procedures    MRI Lumbar Spine Without Contrast     Standing Status:   Future     Standing Expiration Date:   10/23/2025     Order Specific Question:   Does the patient have or ever had a pacemaker or a defibrillator (Note: Some facilities may not be able to schedule an MRI for patients with pacemakers and defibrillators. You should contact your local radiology dept to determine if this is the case.)?     Answer:   Yes     Comments:   MRI Surescan Pacemaker implanted     Order Specific Question:   Does the patient have an aneurysm or surgical clip, pump, nerve/brain stimulator, middle/inner ear prosthesis, or other metal implant or foreign object (bullet, shrapnel)? If they have a card related to their implant, ask them to bring it. Issues related to the implant may cause the MRI to be delayed.     Answer:   No     Order Specific Question:   Will the patient require po anxiolysis or sedation?     Answer:   No     Order Specific Question:   May the Radiologist modify the order per protocol to meet the clinical needs of the patient?      Answer:   Yes     Order Specific Question:   Recist criteria?     Answer:   Yes     Order Specific Question:   Does the patient have on a skin patch for medication with aluminized backing?     Answer:   No    Controlled Substance Monitoring Panel, Random, Urine     Standing Status:   Future     Number of Occurrences:   1     Standing Expiration Date:   12/22/2025    POCT Urine Drug Screen ([facility])     Interpretive Information:     Negative:  No drug detected at the cut off level.   Positive:  This result represents presumptive positive for the   tested drug, other substances may yield a positive response other   than the analyte of interest. This result should be utilized for   diagnostic purpose only. Confirmation testing will be performed upon physician request only.         7.Patient To return after MRI for re-evaluation    The total time spent for evaluation and management on 10/25/2024 including reviewing separately obtained history, performing a medically appropriate exam and evaluation, documenting clinical information in the health record, independently interpreting results and communicating them to the patient/family/caregiver, and ordering medications/tests/procedures was between 15-29 minutes.    The above plan and management options were discussed at length with patient. Patient is in agreement with the above and verbalized understanding. It will be communicated with the referring physician via electronic record, fax, or mail.    Juliette Washburn  10/25/2024

## 2024-10-23 NOTE — PROGRESS NOTES
Population Health Chart Review & Patient Outreach Details      Further Action Needed If Patient Returns Outreach:            Updates Requested / Reviewed:     []  Care Everywhere    []     []  External Sources ([facility], [facility], [facility], etc.)    [] [facility]   [] [facility]   [] Other:    []  Care Team Updated   []  Removed  or Duplicate Orders   []  Immunization Reconciliation Completed / Queried    [] [place]   [] [place]   [] [place]   [] [place]      Health Maintenance Topics Addressed and Outreach Outcomes / Actions Taken:             Breast Cancer Screening []  Mammogram Order Placed    []  Mammogram Screening Scheduled    []  External Records Requested & Care Team Updated if Applicable    []  External Records Uploaded & Care Team Updated if Applicable    []  Pt Declined Scheduling Mammogram    []  Pt Will Schedule with External Provider / Order Routed & Care Team Updated if Applicable              Cervical Cancer Screening []  Pap Smear Scheduled in Primary Care or OBGYN    []  External Records Requested & Care Team Updated if Applicable       []  External Records Uploaded, Care Team Updated, & History Updated if Applicable    []  Patient Declined Scheduling Pap Smear    []  Patient Will Schedule with External Provider & Care Team Updated if Applicable                  Colorectal Cancer Screening []  Colonoscopy Case Request / Referral / Home Test Order Placed    []  External Records Requested & Care Team Updated if Applicable    []  External Records Uploaded, Care Team Updated, & History Updated if Applicable    []  Patient Declined Completing Colon Cancer Screening    []  Patient Will Schedule with External Provider & Care Team Updated if Applicable    []  Fit Kit Mailed (add the SmartPhrase under additional notes)    []  Reminded Patient to Complete Home Test                Diabetic Eye Exam []  Eye Exam Screening Order Placed    []  Eye Camera Scheduled or Optometry/Ophthalmology Referral  Placed    []  External Records Requested & Care Team Updated if Applicable    []  External Records Uploaded, Care Team Updated, & History Updated if Applicable    []  Patient Declined Scheduling Eye Exam    []  Patient Will Schedule with External Provider & Care Team Updated if Applicable             Blood Pressure Control []  Primary Care Follow Up Visit Scheduled     []  Remote Blood Pressure Reading Captured    []  Patient Declined Remote Reading or Scheduling Appt - Escalated to PCP    []  Patient Will Call Back or Send Portal Message with Reading                 HbA1c & Other Labs []  Overdue Lab(s) Ordered    []  Overdue Lab(s) Scheduled    []  External Records Uploaded & Care Team Updated if Applicable    []  Primary Care Follow Up Visit Scheduled     []  Reminded Patient to Complete A1c Home Test    []  Patient Declined Scheduling Labs or Will Call Back to Schedule    []  Patient Will Schedule with External Provider / Order Routed, & Care Team Updated if Applicable           Primary Care Appointment []  Primary Care Appt Scheduled    []  Patient Declined Scheduling or Will Call Back to Schedule    []  Pt Established with External Provider, Updated Care Team, & Informed Pt to Notify Payor if Applicable           Medication Adherence /    Statin Use []  Primary Care Appointment Scheduled    []  Patient Reminded to  Prescription    []  Patient Declined, Provider Notified if Needed    []  Sent Provider Message to Review to Evaluate Pt for Statin, Add Exclusion Dx Codes, Document   Exclusion in Problem List, Change Statin Intensity Level to Moderate or High Intensity if Applicable                Osteoporosis Screening []  Dexa Order Placed    []  Dexa Appointment Scheduled    []  External Records Requested & Care Team Updated    []  External Records Uploaded, Care Team Updated, & History Updated if Applicable    []  Patient Declined Scheduling Dexa or Will Call Back to Schedule    []  Patient Will Schedule  with External Provider / Order Routed & Care Team Updated if Applicable       Additional Notes:.  Post visit Population Health review of encounter with date of service  10/22/24 with Jasvir.  Not All required HY components in encounter. Needs A1c order was added  to yesterdays labs called outreach and they did not receive the A1c message sent vis secure chat to Jasvir's nurse.  Followup appt for: 4/15/24 HY

## 2024-10-25 ENCOUNTER — TELEPHONE (OUTPATIENT)
Dept: FAMILY MEDICINE | Facility: CLINIC | Age: 69
End: 2024-10-25
Payer: COMMERCIAL

## 2024-10-25 NOTE — TELEPHONE ENCOUNTER
----- Message from David Tay MD sent at 10/24/2024  7:53 AM CDT -----  Office visit for prediabetes and LDL

## 2024-10-25 NOTE — TELEPHONE ENCOUNTER
Pt notified that Dr Tay advised ov for predm and choles and she voiced understanding and was transferred to scheduling.

## 2024-10-28 ENCOUNTER — TELEPHONE (OUTPATIENT)
Dept: FAMILY MEDICINE | Facility: CLINIC | Age: 69
End: 2024-10-28
Payer: COMMERCIAL

## 2024-11-12 ENCOUNTER — HOSPITAL ENCOUNTER (OUTPATIENT)
Dept: RADIOLOGY | Facility: HOSPITAL | Age: 69
Discharge: HOME | End: 2024-11-12
Attending: FAMILY MEDICINE
Payer: COMMERCIAL

## 2024-11-12 ENCOUNTER — TELEPHONE (OUTPATIENT)
Dept: FAMILY MEDICINE | Facility: CLINIC | Age: 69
End: 2024-11-12
Payer: COMMERCIAL

## 2024-11-12 DIAGNOSIS — Z78.0 ENCOUNTER FOR OSTEOPOROSIS SCREENING IN ASYMPTOMATIC POSTMENOPAUSAL PATIENT: ICD-10-CM

## 2024-11-12 DIAGNOSIS — Z13.820 ENCOUNTER FOR OSTEOPOROSIS SCREENING IN ASYMPTOMATIC POSTMENOPAUSAL PATIENT: ICD-10-CM

## 2024-11-12 PROCEDURE — 77080 DXA BONE DENSITY AXIAL: CPT | Mod: 26,,, | Performed by: NUCLEAR MEDICINE

## 2024-11-12 PROCEDURE — 77080 DXA BONE DENSITY AXIAL: CPT | Mod: TC

## 2024-11-12 NOTE — TELEPHONE ENCOUNTER
----- Message from David Tay MD sent at 11/12/2024 11:58 AM CST -----  The DEXA scan is in the osteopenia range.  I recommend 1200 mg of elemental calcium daily as well as 600 units of vitamin-D daily.  Recheck in 2 years.

## 2024-11-13 ENCOUNTER — TELEPHONE (OUTPATIENT)
Dept: FAMILY MEDICINE | Facility: CLINIC | Age: 69
End: 2024-11-13
Payer: COMMERCIAL

## 2024-11-13 NOTE — TELEPHONE ENCOUNTER
Pt returned call to clinic and was notified that dexa showed that she was in the osteopenia range and that Dr Tay recommends otc calcium 1200mg daily and vit d 600 units daily and repeat scan in 2 years. She voiced understanding but also voiced concerned regarding taking too much calcium due to it may effect her heart and blood stream, notified her that if she would like to wait to further discuss with Dr Tay at her appointment on 11/26/24 she can, she agreed to this, Dr Tay notified and agreed too.

## 2024-11-23 ENCOUNTER — PATIENT MESSAGE (OUTPATIENT)
Dept: FAMILY MEDICINE | Facility: CLINIC | Age: 69
End: 2024-11-23
Payer: COMMERCIAL

## 2024-11-27 ENCOUNTER — OFFICE VISIT (OUTPATIENT)
Dept: FAMILY MEDICINE | Facility: CLINIC | Age: 69
End: 2024-11-27
Payer: COMMERCIAL

## 2024-11-27 VITALS
HEART RATE: 89 BPM | TEMPERATURE: 98 F | BODY MASS INDEX: 33.9 KG/M2 | WEIGHT: 161.5 LBS | SYSTOLIC BLOOD PRESSURE: 131 MMHG | RESPIRATION RATE: 20 BRPM | OXYGEN SATURATION: 96 % | DIASTOLIC BLOOD PRESSURE: 80 MMHG | HEIGHT: 58 IN

## 2024-11-27 DIAGNOSIS — M50.30 DEGENERATIVE DISC DISEASE, CERVICAL: Primary | ICD-10-CM

## 2024-11-27 PROCEDURE — 99213 OFFICE O/P EST LOW 20 MIN: CPT | Mod: ,,, | Performed by: FAMILY MEDICINE

## 2024-11-27 PROCEDURE — 3075F SYST BP GE 130 - 139MM HG: CPT | Mod: ,,, | Performed by: FAMILY MEDICINE

## 2024-11-27 PROCEDURE — 3008F BODY MASS INDEX DOCD: CPT | Mod: ,,, | Performed by: FAMILY MEDICINE

## 2024-11-27 PROCEDURE — 3079F DIAST BP 80-89 MM HG: CPT | Mod: ,,, | Performed by: FAMILY MEDICINE

## 2024-11-27 PROCEDURE — 1159F MED LIST DOCD IN RCRD: CPT | Mod: ,,, | Performed by: FAMILY MEDICINE

## 2024-11-27 PROCEDURE — 3044F HG A1C LEVEL LT 7.0%: CPT | Mod: ,,, | Performed by: FAMILY MEDICINE

## 2024-11-27 PROCEDURE — 1160F RVW MEDS BY RX/DR IN RCRD: CPT | Mod: ,,, | Performed by: FAMILY MEDICINE

## 2024-11-27 PROCEDURE — 3288F FALL RISK ASSESSMENT DOCD: CPT | Mod: ,,, | Performed by: FAMILY MEDICINE

## 2024-11-27 PROCEDURE — 1101F PT FALLS ASSESS-DOCD LE1/YR: CPT | Mod: ,,, | Performed by: FAMILY MEDICINE

## 2024-11-27 RX ORDER — DULOXETIN HYDROCHLORIDE 20 MG/1
20 CAPSULE, DELAYED RELEASE ORAL DAILY
Qty: 30 CAPSULE | Refills: 1 | Status: SHIPPED | OUTPATIENT
Start: 2024-11-27 | End: 2025-11-27

## 2024-11-27 NOTE — PROGRESS NOTES
Sheila Ortiz is a 69 y.o. female seen today for lab follow-up.  The patient was prediabetic although her lipids were markedly improved.  We discussed a low-starch diet.  Also she does suffer from depression but uses her Zoloft as needed mostly for her neuropathy.  She reports she uses his perhaps once a month.  Since she suffers from chronic pain related to cervical and lumbar disc disease discussed a trial of low-dose Cymbalta daily.      Past Medical History:   Diagnosis Date    Diabetes mellitus, type 2     Hypothyroidism      No family history on file.  Current Outpatient Medications on File Prior to Visit   Medication Sig Dispense Refill    ALPRAZolam (XANAX) 0.5 MG tablet Take 0.5 mg by mouth as needed.      cyclobenzaprine (FLEXERIL) 10 MG tablet Take 10 mg by mouth Daily.      diltiaZEM (CARDIZEM CD) 120 MG Cp24 Take 120 mg by mouth once daily. At night      donepeziL (ARICEPT) 10 MG tablet Take 10 mg by mouth every morning.      hydroCHLOROthiazide (HYDRODIURIL) 25 MG tablet Take 1 tablet (25 mg total) by mouth once daily. 30 tablet 5    levothyroxine (SYNTHROID) 25 MCG tablet Take 25 mcg by mouth before breakfast.      prazosin (MINIPRESS) 1 MG Cap Take 1 mg by mouth every evening.      rosuvastatin (CRESTOR) 10 MG tablet Take 10 mg by mouth.      traZODone (DESYREL) 150 MG tablet Take 150 mg by mouth nightly as needed.      [DISCONTINUED] sertraline (ZOLOFT) 100 MG tablet Take 50 mg by mouth every evening.       No current facility-administered medications on file prior to visit.       There is no immunization history on file for this patient.    Review of Systems   Constitutional:  Negative for fever, malaise/fatigue and weight loss.   Respiratory:  Negative for shortness of breath.    Cardiovascular:  Negative for chest pain and palpitations.   Gastrointestinal:  Negative for nausea and vomiting.   Musculoskeletal:  Positive for back pain, myalgias and neck pain.   Psychiatric/Behavioral:  Positive for  depression. The patient is nervous/anxious.         Vitals:    11/27/24 1331   BP: 131/80   Pulse: 89   Resp: 20   Temp: 98.2 °F (36.8 °C)       Physical Exam  Vitals reviewed.   Eyes:      Conjunctiva/sclera: Conjunctivae normal.   Pulmonary:      Effort: Pulmonary effort is normal.   Neurological:      Mental Status: She is alert.   Psychiatric:         Mood and Affect: Mood normal.         Behavior: Behavior normal.         Thought Content: Thought content normal.         Judgment: Judgment normal.          Assessment and Plan  1. Degenerative disc disease, cervical  -     DULoxetine (CYMBALTA) 20 MG capsule; Take 1 capsule (20 mg total) by mouth once daily.  Dispense: 30 capsule; Refill: 1             Return to clinic in 1 month for follow-up on her Cymbalta and then in 3 months to repeat her A1c.    Health Maintenance Topics with due status: Not Due       Topic Last Completion Date    Hemoglobin A1c (Prediabetes) 10/22/2024    Lipid Panel 10/22/2024    DEXA Scan 11/12/2024    RSV Vaccine (Age 60+ and Pregnant patients) Not Due

## 2024-12-17 ENCOUNTER — PATIENT MESSAGE (OUTPATIENT)
Dept: PAIN MEDICINE | Facility: CLINIC | Age: 69
End: 2024-12-17
Payer: COMMERCIAL

## 2024-12-17 ENCOUNTER — TELEPHONE (OUTPATIENT)
Dept: PAIN MEDICINE | Facility: CLINIC | Age: 69
End: 2024-12-17
Payer: COMMERCIAL

## 2024-12-17 DIAGNOSIS — M54.16 LUMBAR RADICULOPATHY, CHRONIC: Primary | ICD-10-CM

## 2024-12-17 RX ORDER — DIAZEPAM 10 MG/1
10 TABLET ORAL DAILY
Qty: 1 TABLET | Refills: 0 | Status: SHIPPED | OUTPATIENT
Start: 2024-12-17 | End: 2024-12-18

## 2024-12-17 NOTE — TELEPHONE ENCOUNTER
----- Message from Nurse Ac sent at 12/17/2024  8:44 AM CST -----  Regarding: Denial  I have spoken to patient this am about procedure- Right L4-S4 TFESI being denied per insurance. Patient is requesting that [facility] staff call her for further treatment. Thank you.    LISA PÉREZ   12/17/2024   12:35 PM   Attempted to call no answer, left message

## 2025-01-02 ENCOUNTER — PATIENT MESSAGE (OUTPATIENT)
Dept: ADMINISTRATIVE | Facility: OTHER | Age: 70
End: 2025-01-02

## 2025-01-02 ENCOUNTER — PATIENT MESSAGE (OUTPATIENT)
Dept: ADMINISTRATIVE | Facility: HOSPITAL | Age: 70
End: 2025-01-02

## 2025-01-08 ENCOUNTER — TELEPHONE (OUTPATIENT)
Dept: FAMILY MEDICINE | Facility: CLINIC | Age: 70
End: 2025-01-08
Payer: COMMERCIAL

## 2025-01-08 NOTE — TELEPHONE ENCOUNTER
----- Message from Med Assistant Callaway sent at 1/8/2025  9:18 AM CST -----  Pt called saying she is needing a referral put in for pain tx to see Dr Cullen.  Pt was using [facility] but pt is saying they have closed and she really needs to see someone because she is in severe pain.

## 2025-01-09 ENCOUNTER — PATIENT OUTREACH (OUTPATIENT)
Facility: HOSPITAL | Age: 70
End: 2025-01-09
Payer: COMMERCIAL

## 2025-01-09 ENCOUNTER — PATIENT MESSAGE (OUTPATIENT)
Dept: FAMILY MEDICINE | Facility: CLINIC | Age: 70
End: 2025-01-09
Payer: COMMERCIAL

## 2025-01-09 NOTE — PROGRESS NOTES
Population Health Chart Review & Patient Outreach Details  Per [facility] website, insurance is active and pt is listed on the attributed list needing a healthy you performed in   HY scheduled for 4/15/2025    Further Action Needed If Patient Returns Outreach:            Updates Requested / Reviewed:     []  Care Everywhere    []     []  External Sources ([facility], [facility], [facility], etc.)    [] [facility]   [] [facility]   [] Other:    []  Care Team Updated   []  Removed  or Duplicate Orders   []  Immunization Reconciliation Completed / Queried    [] [place]   [] [place]   [] [place]   [] [place]      Health Maintenance Topics Addressed and Outreach Outcomes / Actions Taken:             Breast Cancer Screening []  Mammogram Order Placed    []  Mammogram Screening Scheduled    []  External Records Requested & Care Team Updated if Applicable    []  External Records Uploaded & Care Team Updated if Applicable    []  Pt Declined Scheduling Mammogram    []  Pt Will Schedule with External Provider / Order Routed & Care Team Updated if Applicable              Cervical Cancer Screening []  Pap Smear Scheduled in Primary Care or OBGYN    []  External Records Requested & Care Team Updated if Applicable       []  External Records Uploaded, Care Team Updated, & History Updated if Applicable    []  Patient Declined Scheduling Pap Smear    []  Patient Will Schedule with External Provider & Care Team Updated if Applicable                  Colorectal Cancer Screening []  Colonoscopy Case Request / Referral / Home Test Order Placed    []  External Records Requested & Care Team Updated if Applicable    []  External Records Uploaded, Care Team Updated, & History Updated if Applicable    []  Patient Declined Completing Colon Cancer Screening    []  Patient Will Schedule with External Provider & Care Team Updated if Applicable    []  Fit Kit Mailed (add the SmartPhrase under additional notes)    []  Reminded Patient to Complete  Home Test                Diabetic Eye Exam []  Eye Exam Screening Order Placed    []  Eye Camera Scheduled or Optometry/Ophthalmology Referral Placed    []  External Records Requested & Care Team Updated if Applicable    []  External Records Uploaded, Care Team Updated, & History Updated if Applicable    []  Patient Declined Scheduling Eye Exam    []  Patient Will Schedule with External Provider & Care Team Updated if Applicable             Blood Pressure Control []  Primary Care Follow Up Visit Scheduled     []  Remote Blood Pressure Reading Captured    []  Patient Declined Remote Reading or Scheduling Appt - Escalated to PCP    []  Patient Will Call Back or Send Portal Message with Reading                 HbA1c & Other Labs []  Overdue Lab(s) Ordered    []  Overdue Lab(s) Scheduled    []  External Records Uploaded & Care Team Updated if Applicable    []  Primary Care Follow Up Visit Scheduled     []  Reminded Patient to Complete A1c Home Test    []  Patient Declined Scheduling Labs or Will Call Back to Schedule    []  Patient Will Schedule with External Provider / Order Routed, & Care Team Updated if Applicable           Primary Care Appointment []  Primary Care Appt Scheduled    []  Patient Declined Scheduling or Will Call Back to Schedule    []  Pt Established with External Provider, Updated Care Team, & Informed Pt to Notify Payor if Applicable           Medication Adherence /    Statin Use []  Primary Care Appointment Scheduled    []  Patient Reminded to  Prescription    []  Patient Declined, Provider Notified if Needed    []  Sent Provider Message to Review to Evaluate Pt for Statin, Add Exclusion Dx Codes, Document   Exclusion in Problem List, Change Statin Intensity Level to Moderate or High Intensity if Applicable                Osteoporosis Screening []  Dexa Order Placed    []  Dexa Appointment Scheduled    []  External Records Requested & Care Team Updated    []  External Records Uploaded, Care  Team Updated, & History Updated if Applicable    []  Patient Declined Scheduling Dexa or Will Call Back to Schedule    []  Patient Will Schedule with External Provider / Order Routed & Care Team Updated if Applicable       Additional Notes:

## 2025-01-27 DIAGNOSIS — M50.30 DEGENERATIVE DISC DISEASE, CERVICAL: ICD-10-CM

## 2025-01-27 RX ORDER — DULOXETIN HYDROCHLORIDE 20 MG/1
20 CAPSULE, DELAYED RELEASE ORAL DAILY
Qty: 90 CAPSULE | Refills: 1 | Status: SHIPPED | OUTPATIENT
Start: 2025-01-27 | End: 2025-07-26

## 2025-01-27 NOTE — TELEPHONE ENCOUNTER
----- Message from Vidya sent at 1/27/2025  2:53 PM CST -----  Regarding: Cymbalta  Pt canceled appt for today due to forgetting that she had one today and had just took her medication that makes her drowsy. Dr Tay won't have another opening until 02/26/2025, which pt already has a 3 month appt scheduled on. Pt states  the purpose of the appt today was to discuss how she was doing on the prescription of DULoxetine (CYMBALTA) 20 MG capsule that she was prescribed. Pt states she is doing great on it, no side effects. She states she will need a refill if Dr Tay still wants her to take it.     Pt phone #: [phone]

## 2025-02-10 ENCOUNTER — HOSPITAL ENCOUNTER (OUTPATIENT)
Dept: RADIOLOGY | Facility: HOSPITAL | Age: 70
Discharge: HOME | End: 2025-02-10
Attending: NURSE PRACTITIONER
Payer: COMMERCIAL

## 2025-02-10 DIAGNOSIS — M25.559 HIP PAIN: ICD-10-CM

## 2025-02-10 PROCEDURE — 73521 X-RAY EXAM HIPS BI 2 VIEWS: CPT | Mod: TC

## 2025-03-13 ENCOUNTER — OFFICE VISIT (OUTPATIENT)
Dept: FAMILY MEDICINE | Facility: CLINIC | Age: 70
End: 2025-03-13
Payer: COMMERCIAL

## 2025-03-13 VITALS
WEIGHT: 172 LBS | TEMPERATURE: 98 F | DIASTOLIC BLOOD PRESSURE: 80 MMHG | HEART RATE: 62 BPM | SYSTOLIC BLOOD PRESSURE: 132 MMHG | OXYGEN SATURATION: 96 % | RESPIRATION RATE: 18 BRPM | HEIGHT: 58 IN | BODY MASS INDEX: 36.11 KG/M2

## 2025-03-13 DIAGNOSIS — Z12.11 SCREENING FOR MALIGNANT NEOPLASM OF COLON: ICD-10-CM

## 2025-03-13 DIAGNOSIS — M50.30 DEGENERATIVE DISC DISEASE, CERVICAL: ICD-10-CM

## 2025-03-13 DIAGNOSIS — R73.03 PREDIABETES: Primary | ICD-10-CM

## 2025-03-13 DIAGNOSIS — Z23 NEED FOR PROPHYLACTIC VACCINATION AGAINST STREPTOCOCCUS PNEUMONIAE (PNEUMOCOCCUS): ICD-10-CM

## 2025-03-13 DIAGNOSIS — Z23 NEED FOR IMMUNIZATION AGAINST INFLUENZA: ICD-10-CM

## 2025-03-13 DIAGNOSIS — R60.0 EDEMA OF LEG: ICD-10-CM

## 2025-03-13 DIAGNOSIS — E04.2 MULTIPLE THYROID NODULES: ICD-10-CM

## 2025-03-13 LAB
ALBUMIN SERPL BCP-MCNC: 4 G/DL (ref 3.4–4.8)
ALBUMIN/GLOB SERPL: 1.3 {RATIO}
ALP SERPL-CCNC: 43 U/L (ref 40–150)
ALT SERPL W P-5'-P-CCNC: 14 U/L
ANION GAP SERPL CALCULATED.3IONS-SCNC: 17 MMOL/L (ref 7–16)
AST SERPL W P-5'-P-CCNC: 28 U/L (ref 11–45)
BASOPHILS # BLD AUTO: 0.05 K/UL (ref 0–0.2)
BASOPHILS NFR BLD AUTO: 0.7 % (ref 0–1)
BILIRUB SERPL-MCNC: 0.3 MG/DL
BUN SERPL-MCNC: 25 MG/DL (ref 10–20)
BUN/CREAT SERPL: 24 (ref 6–20)
CALCIUM SERPL-MCNC: 9.6 MG/DL (ref 8.4–10.2)
CHLORIDE SERPL-SCNC: 104 MMOL/L (ref 98–107)
CO2 SERPL-SCNC: 26 MMOL/L (ref 23–31)
CREAT SERPL-MCNC: 1.03 MG/DL (ref 0.55–1.02)
DIFFERENTIAL METHOD BLD: ABNORMAL
EGFR (NO RACE VARIABLE) (RUSH/TITUS): 59 ML/MIN/1.73M2
EOSINOPHIL # BLD AUTO: 0.12 K/UL (ref 0–0.5)
EOSINOPHIL NFR BLD AUTO: 1.7 % (ref 1–4)
ERYTHROCYTE [DISTWIDTH] IN BLOOD BY AUTOMATED COUNT: 14.7 % (ref 11.5–14.5)
EST. AVERAGE GLUCOSE BLD GHB EST-MCNC: 126 MG/DL
GLOBULIN SER-MCNC: 3.1 G/DL (ref 2–4)
GLUCOSE SERPL-MCNC: 96 MG/DL (ref 82–115)
HBA1C MFR BLD HPLC: 6 %
HCT VFR BLD AUTO: 42.1 % (ref 38–47)
HGB BLD-MCNC: 12.9 G/DL (ref 12–16)
IMM GRANULOCYTES # BLD AUTO: 0.01 K/UL (ref 0–0.04)
IMM GRANULOCYTES NFR BLD: 0.1 % (ref 0–0.4)
LYMPHOCYTES # BLD AUTO: 1.45 K/UL (ref 1–4.8)
LYMPHOCYTES NFR BLD AUTO: 20.2 % (ref 27–41)
MCH RBC QN AUTO: 30 PG (ref 27–31)
MCHC RBC AUTO-ENTMCNC: 30.6 G/DL (ref 32–36)
MCV RBC AUTO: 97.9 FL (ref 80–96)
MONOCYTES # BLD AUTO: 0.59 K/UL (ref 0–0.8)
MONOCYTES NFR BLD AUTO: 8.2 % (ref 2–6)
MPC BLD CALC-MCNC: 9.7 FL (ref 9.4–12.4)
NEUTROPHILS # BLD AUTO: 4.96 K/UL (ref 1.8–7.7)
NEUTROPHILS NFR BLD AUTO: 69.1 % (ref 53–65)
NRBC # BLD AUTO: 0 X10E3/UL
NRBC, AUTO (.00): 0 %
PLATELET # BLD AUTO: 406 K/UL (ref 150–400)
POTASSIUM SERPL-SCNC: 4.7 MMOL/L (ref 3.5–5.1)
PROT SERPL-MCNC: 7.1 G/DL (ref 5.8–7.6)
RBC # BLD AUTO: 4.3 M/UL (ref 4.2–5.4)
SODIUM SERPL-SCNC: 142 MMOL/L (ref 136–145)
T4 FREE SERPL-MCNC: 0.8 NG/DL (ref 0.7–1.48)
TSH SERPL DL<=0.005 MIU/L-ACNC: 0.98 UIU/ML (ref 0.35–4.94)
WBC # BLD AUTO: 7.18 K/UL (ref 4.5–11)

## 2025-03-13 PROCEDURE — 83036 HEMOGLOBIN GLYCOSYLATED A1C: CPT | Mod: ,,, | Performed by: CLINICAL MEDICAL LABORATORY

## 2025-03-13 PROCEDURE — 90677 PCV20 VACCINE IM: CPT | Mod: ,,, | Performed by: FAMILY MEDICINE

## 2025-03-13 PROCEDURE — 3075F SYST BP GE 130 - 139MM HG: CPT | Mod: ,,, | Performed by: FAMILY MEDICINE

## 2025-03-13 PROCEDURE — 3288F FALL RISK ASSESSMENT DOCD: CPT | Mod: ,,, | Performed by: FAMILY MEDICINE

## 2025-03-13 PROCEDURE — 3008F BODY MASS INDEX DOCD: CPT | Mod: ,,, | Performed by: FAMILY MEDICINE

## 2025-03-13 PROCEDURE — 1160F RVW MEDS BY RX/DR IN RCRD: CPT | Mod: ,,, | Performed by: FAMILY MEDICINE

## 2025-03-13 PROCEDURE — 1159F MED LIST DOCD IN RCRD: CPT | Mod: ,,, | Performed by: FAMILY MEDICINE

## 2025-03-13 PROCEDURE — 99214 OFFICE O/P EST MOD 30 MIN: CPT | Mod: 25,,, | Performed by: FAMILY MEDICINE

## 2025-03-13 PROCEDURE — 90472 IMMUNIZATION ADMIN EACH ADD: CPT | Mod: ,,, | Performed by: FAMILY MEDICINE

## 2025-03-13 PROCEDURE — 90653 IIV ADJUVANT VACCINE IM: CPT | Mod: ,,, | Performed by: FAMILY MEDICINE

## 2025-03-13 PROCEDURE — 1125F AMNT PAIN NOTED PAIN PRSNT: CPT | Mod: ,,, | Performed by: FAMILY MEDICINE

## 2025-03-13 PROCEDURE — 90471 IMMUNIZATION ADMIN: CPT | Mod: ,,, | Performed by: FAMILY MEDICINE

## 2025-03-13 PROCEDURE — 1101F PT FALLS ASSESS-DOCD LE1/YR: CPT | Mod: ,,, | Performed by: FAMILY MEDICINE

## 2025-03-13 PROCEDURE — 3079F DIAST BP 80-89 MM HG: CPT | Mod: ,,, | Performed by: FAMILY MEDICINE

## 2025-03-13 RX ORDER — HYDROCHLOROTHIAZIDE 25 MG/1
25 TABLET ORAL DAILY
Qty: 30 TABLET | Refills: 5 | Status: SHIPPED | OUTPATIENT
Start: 2025-03-13

## 2025-03-13 RX ORDER — PREGABALIN 100 MG/1
100 CAPSULE ORAL 2 TIMES DAILY
COMMUNITY
Start: 2025-02-18

## 2025-03-13 RX ORDER — DULOXETIN HYDROCHLORIDE 60 MG/1
60 CAPSULE, DELAYED RELEASE ORAL DAILY
Qty: 30 CAPSULE | Refills: 5 | Status: SHIPPED | OUTPATIENT
Start: 2025-03-13 | End: 2026-03-13

## 2025-03-13 RX ORDER — DEXTROAMPHETAMINE SACCHARATE, AMPHETAMINE ASPARTATE, DEXTROAMPHETAMINE SULFATE AND AMPHETAMINE SULFATE 5; 5; 5; 5 MG/1; MG/1; MG/1; MG/1
1 TABLET ORAL EVERY MORNING
COMMUNITY
Start: 2024-10-23

## 2025-03-13 NOTE — PROGRESS NOTES
Sheila Ortiz is a 69 y.o. female seen today for follow-up on her prediabetes and hypothyroidism.  She reports the Cymbalta is working well and she would like to increase the dose.  She denies any nausea side effects.    Past Medical History:   Diagnosis Date    Diabetes mellitus, type 2     Hypothyroidism      No family history on file.  Medications Ordered Prior to Encounter[1]  There is no immunization history for the selected administration types on file for this patient.    Review of Systems   Constitutional:  Negative for fever, malaise/fatigue and weight loss.   Respiratory:  Negative for shortness of breath.    Cardiovascular:  Negative for chest pain and palpitations.   Gastrointestinal:  Negative for nausea and vomiting.   Musculoskeletal:  Positive for joint pain, myalgias and neck pain.   Psychiatric/Behavioral:  Negative for depression.         Vitals:    03/13/25 1448   BP: 132/80   Pulse: 62   Resp: 18   Temp: 98 °F (36.7 °C)       Physical Exam  Vitals reviewed.   Constitutional:       Appearance: Normal appearance.   HENT:      Head: Normocephalic.   Eyes:      Extraocular Movements: Extraocular movements intact.      Conjunctiva/sclera: Conjunctivae normal.      Pupils: Pupils are equal, round, and reactive to light.   Neck:      Thyroid: No thyroid mass or thyromegaly.   Cardiovascular:      Rate and Rhythm: Normal rate and regular rhythm.      Heart sounds: Normal heart sounds. No murmur heard.     No gallop.   Pulmonary:      Effort: Pulmonary effort is normal. No respiratory distress.      Breath sounds: Normal breath sounds. No wheezing or rales.   Skin:     General: Skin is warm and dry.      Coloration: Skin is not jaundiced or pale.   Neurological:      Mental Status: She is alert.   Psychiatric:         Mood and Affect: Mood normal.         Behavior: Behavior normal.         Thought Content: Thought content normal.         Judgment: Judgment normal.          Assessment and Plan  1.  Prediabetes  -     CBC Auto Differential; Future; Expected date: 03/13/2025  -     Comprehensive Metabolic Panel; Future; Expected date: 03/13/2025  -     Hemoglobin A1C; Future; Expected date: 03/13/2025    2. Multiple thyroid nodules  -     Thyroid Panel; Future; Expected date: 03/13/2025    3. Need for immunization against influenza  -     influenza (adjuvanted) (Fluad) 45 mcg/0.5 mL IM vaccine (> or = 66 yo) 0.5 mL    4. Need for prophylactic vaccination against Streptococcus pneumoniae (pneumococcus)  -     pneumoc 20-staci conj-dip cr(PF) (PREVNAR-20 (PF)) injection Syrg 0.5 mL    5. Screening for malignant neoplasm of colon  -     Cologuard Screening (Multitarget Stool DNA); Future; Expected date: 03/13/2025    6. Edema of leg  -     hydroCHLOROthiazide (HYDRODIURIL) 25 MG tablet; Take 1 tablet (25 mg total) by mouth once daily.  Dispense: 30 tablet; Refill: 5    7. Degenerative disc disease, cervical  -     DULoxetine (CYMBALTA) 60 MG capsule; Take 1 capsule (60 mg total) by mouth once daily.  Dispense: 30 capsule; Refill: 5             Return to clinic in 6 months or as needed once her lab work is in.    Health Maintenance Topics with due status: Not Due       Topic Last Completion Date    Hemoglobin A1c (Prediabetes) 10/22/2024    Lipid Panel 10/22/2024    DEXA Scan 11/12/2024    RSV Vaccine (Age 60+ and Pregnant patients) Not Due              [1]   Current Outpatient Medications on File Prior to Visit   Medication Sig Dispense Refill    ALPRAZolam (XANAX) 0.5 MG tablet Take 0.5 mg by mouth as needed.      cyclobenzaprine (FLEXERIL) 10 MG tablet Take 10 mg by mouth Daily.      dextroamphetamine-amphetamine (ADDERALL) 20 mg tablet Take 1 tablet by mouth every morning.      donepeziL (ARICEPT) 10 MG tablet Take 10 mg by mouth every morning.      levothyroxine (SYNTHROID) 25 MCG tablet Take 25 mcg by mouth before breakfast.      prazosin (MINIPRESS) 1 MG Cap Take 1 mg by mouth every evening.      pregabalin  (LYRICA) 100 MG capsule Take 100 mg by mouth 2 (two) times daily.      rosuvastatin (CRESTOR) 10 MG tablet Take 10 mg by mouth.      traZODone (DESYREL) 150 MG tablet Take 150 mg by mouth nightly as needed.      [DISCONTINUED] DULoxetine (CYMBALTA) 20 MG capsule Take 1 capsule (20 mg total) by mouth once daily. 90 capsule 1    [DISCONTINUED] hydroCHLOROthiazide (HYDRODIURIL) 25 MG tablet Take 1 tablet (25 mg total) by mouth once daily. 30 tablet 5    diazePAM (VALIUM) 10 MG Tab Take 1 tablet (10 mg total) by mouth once daily. for 1 day 1 tablet 0    diltiaZEM (CARDIZEM CD) 120 MG Cp24 Take 120 mg by mouth once daily. At night (Patient not taking: Reported on 3/13/2025)       No current facility-administered medications on file prior to visit.

## 2025-03-14 ENCOUNTER — RESULTS FOLLOW-UP (OUTPATIENT)
Dept: FAMILY MEDICINE | Facility: CLINIC | Age: 70
End: 2025-03-14
Payer: COMMERCIAL

## 2025-03-14 ENCOUNTER — TELEPHONE (OUTPATIENT)
Dept: FAMILY MEDICINE | Facility: CLINIC | Age: 70
End: 2025-03-14
Payer: COMMERCIAL

## 2025-03-14 NOTE — TELEPHONE ENCOUNTER
----- Message from David Tay MD sent at 3/14/2025  8:05 AM CDT -----  Good thyroid studies with mildly improved renal function but the patient needs increase her intake of fluids.  She is prediabetic and will need a follow-up A1c in 3 months  ----- Message -----  From: Lab, Background User  Sent: 3/13/2025   8:38 PM CDT  To: David Tay MD

## 2025-03-17 ENCOUNTER — PATIENT MESSAGE (OUTPATIENT)
Dept: FAMILY MEDICINE | Facility: CLINIC | Age: 70
End: 2025-03-17
Payer: COMMERCIAL

## 2025-03-19 ENCOUNTER — TELEPHONE (OUTPATIENT)
Dept: FAMILY MEDICINE | Facility: CLINIC | Age: 70
End: 2025-03-19
Payer: COMMERCIAL

## 2025-03-19 NOTE — TELEPHONE ENCOUNTER
Notified patient of results. Patient voiced understanding.    Patient asked about sending in her synthroid medication (25mcg) to keep her thyroid goiter from growing. Asked if patient if she is currently taking it or if she has been out. Patient states that she has been out for quite some time. And that no provider previously has had an issue with prescribing it before. Notified patient that I am sure it wouldn't be an issue but I would have to ask that provider. Patient voiced understanding. I asked if patient has had an updated US on her thyroid since it recommends annually. Patient has not had one since then. Asked if patient would be fine with doing one if the provider needed an updated one. Patient stated she thinks that they are a waste of money and doesn't see the point in that and that she will just find a different provider. Patient then abruptly hung up.

## 2025-03-26 DIAGNOSIS — E04.2 MULTIPLE THYROID NODULES: Primary | ICD-10-CM

## 2025-03-28 RX ORDER — LEVOTHYROXINE SODIUM 25 UG/1
25 TABLET ORAL
Qty: 90 TABLET | Refills: 1 | Status: SHIPPED | OUTPATIENT
Start: 2025-03-28

## 2025-04-02 ENCOUNTER — HOSPITAL ENCOUNTER (OUTPATIENT)
Dept: RADIOLOGY | Facility: HOSPITAL | Age: 70
Discharge: HOME | End: 2025-04-02
Attending: FAMILY MEDICINE
Payer: COMMERCIAL

## 2025-04-02 DIAGNOSIS — E04.2 MULTIPLE THYROID NODULES: ICD-10-CM

## 2025-04-02 PROCEDURE — 76536 US EXAM OF HEAD AND NECK: CPT | Mod: 26,,, | Performed by: RADIOLOGY

## 2025-04-02 PROCEDURE — 76536 US EXAM OF HEAD AND NECK: CPT | Mod: TC

## 2025-04-03 ENCOUNTER — TELEPHONE (OUTPATIENT)
Dept: FAMILY MEDICINE | Facility: CLINIC | Age: 70
End: 2025-04-03
Payer: COMMERCIAL

## 2025-04-03 ENCOUNTER — RESULTS FOLLOW-UP (OUTPATIENT)
Dept: FAMILY MEDICINE | Facility: CLINIC | Age: 70
End: 2025-04-03
Payer: COMMERCIAL

## 2025-04-03 NOTE — TELEPHONE ENCOUNTER
----- Message from David Tay MD sent at 4/3/2025  8:15 AM CDT -----  Patient has bilateral thyroid nodule which is similar to her ultrasound of 2023.  A year follow-up is recommended but if she is concerned we can set her up with General surgery  ----- Message -----  From: Mode, Rad Results In  Sent: 4/2/2025   3:08 PM CDT  To: David Tay MD

## 2025-04-11 ENCOUNTER — TELEPHONE (OUTPATIENT)
Dept: FAMILY MEDICINE | Facility: CLINIC | Age: 70
End: 2025-04-11
Payer: COMMERCIAL

## 2025-08-11 ENCOUNTER — OFFICE VISIT (OUTPATIENT)
Dept: CARDIOLOGY | Facility: CLINIC | Age: 70
End: 2025-08-11
Payer: COMMERCIAL

## 2025-08-11 VITALS
SYSTOLIC BLOOD PRESSURE: 128 MMHG | DIASTOLIC BLOOD PRESSURE: 78 MMHG | OXYGEN SATURATION: 96 % | HEIGHT: 58 IN | BODY MASS INDEX: 36.91 KG/M2 | HEART RATE: 92 BPM | WEIGHT: 175.81 LBS

## 2025-08-11 DIAGNOSIS — G47.00 INSOMNIA, UNSPECIFIED TYPE: Chronic | ICD-10-CM

## 2025-08-11 DIAGNOSIS — E11.9 DIABETES MELLITUS WITHOUT COMPLICATION: Chronic | ICD-10-CM

## 2025-08-11 DIAGNOSIS — E03.9 HYPOTHYROIDISM, UNSPECIFIED TYPE: Chronic | ICD-10-CM

## 2025-08-11 DIAGNOSIS — R07.9 CHEST PAIN, UNSPECIFIED TYPE: Primary | ICD-10-CM

## 2025-08-11 DIAGNOSIS — I51.81 TAKOTSUBO CARDIOMYOPATHY: Chronic | ICD-10-CM

## 2025-08-11 PROCEDURE — 3074F SYST BP LT 130 MM HG: CPT | Mod: S$GLB,,, | Performed by: INTERNAL MEDICINE

## 2025-08-11 PROCEDURE — 1159F MED LIST DOCD IN RCRD: CPT | Mod: S$GLB,,, | Performed by: INTERNAL MEDICINE

## 2025-08-11 PROCEDURE — 3044F HG A1C LEVEL LT 7.0%: CPT | Mod: S$GLB,,, | Performed by: INTERNAL MEDICINE

## 2025-08-11 PROCEDURE — 3288F FALL RISK ASSESSMENT DOCD: CPT | Mod: S$GLB,,, | Performed by: INTERNAL MEDICINE

## 2025-08-11 PROCEDURE — 3078F DIAST BP <80 MM HG: CPT | Mod: S$GLB,,, | Performed by: INTERNAL MEDICINE

## 2025-08-11 PROCEDURE — 1160F RVW MEDS BY RX/DR IN RCRD: CPT | Mod: S$GLB,,, | Performed by: INTERNAL MEDICINE

## 2025-08-11 PROCEDURE — 99999 PR PBB SHADOW E&M-EST. PATIENT-LVL IV: CPT | Mod: PBBFAC,,, | Performed by: INTERNAL MEDICINE

## 2025-08-11 PROCEDURE — 3008F BODY MASS INDEX DOCD: CPT | Mod: S$GLB,,, | Performed by: INTERNAL MEDICINE

## 2025-08-11 PROCEDURE — 1101F PT FALLS ASSESS-DOCD LE1/YR: CPT | Mod: S$GLB,,, | Performed by: INTERNAL MEDICINE

## 2025-08-11 PROCEDURE — 99203 OFFICE O/P NEW LOW 30 MIN: CPT | Mod: S$GLB,,, | Performed by: INTERNAL MEDICINE

## 2025-08-11 PROCEDURE — 93000 ELECTROCARDIOGRAM COMPLETE: CPT | Mod: S$GLB,,, | Performed by: INTERNAL MEDICINE

## 2025-08-12 LAB
OHS QRS DURATION: 80 MS
OHS QTC CALCULATION: 426 MS

## 2025-08-14 RX ORDER — NEBIVOLOL 5 MG/1
5 TABLET ORAL DAILY
Qty: 30 TABLET | Refills: 11 | Status: SHIPPED | OUTPATIENT
Start: 2025-08-14 | End: 2026-08-14

## 2025-08-15 PROBLEM — I51.81 TAKOTSUBO CARDIOMYOPATHY: Chronic | Status: ACTIVE | Noted: 2025-08-15

## 2025-08-15 PROBLEM — R07.9 CHEST PAIN: Status: ACTIVE | Noted: 2025-08-15

## 2025-09-05 DIAGNOSIS — Z95.0 PRESENCE OF CARDIAC PACEMAKER: Primary | ICD-10-CM
